# Patient Record
Sex: FEMALE | Race: WHITE | Employment: PART TIME | URBAN - NONMETROPOLITAN AREA
[De-identification: names, ages, dates, MRNs, and addresses within clinical notes are randomized per-mention and may not be internally consistent; named-entity substitution may affect disease eponyms.]

---

## 2021-08-16 ENCOUNTER — HOSPITAL ENCOUNTER (OUTPATIENT)
Dept: LAB | Age: 41
Discharge: HOME | End: 2021-08-16

## 2021-08-16 PROCEDURE — 99001 SPECIMEN HANDLING PT-LAB: CPT

## 2021-08-17 VITALS — BODY MASS INDEX: 17.08 KG/M2 | WEIGHT: 87 LBS | HEIGHT: 60 IN

## 2021-08-17 RX ORDER — SPIRONOLACTONE 25 MG/1
25 TABLET ORAL DAILY
COMMUNITY
End: 2022-02-28 | Stop reason: SDUPTHER

## 2021-08-17 RX ORDER — ATENOLOL 50 MG/1
50 TABLET ORAL DAILY
COMMUNITY
End: 2022-02-28 | Stop reason: SDUPTHER

## 2021-08-23 RX ORDER — INSULIN LISPRO 100 [IU]/ML
INJECTION, SOLUTION INTRAVENOUS; SUBCUTANEOUS ONCE
Status: CANCELLED | OUTPATIENT
Start: 2021-08-23 | End: 2021-08-24

## 2021-08-23 RX ORDER — SODIUM CHLORIDE, SODIUM LACTATE, POTASSIUM CHLORIDE, CALCIUM CHLORIDE 600; 310; 30; 20 MG/100ML; MG/100ML; MG/100ML; MG/100ML
25 INJECTION, SOLUTION INTRAVENOUS CONTINUOUS
Status: CANCELLED | OUTPATIENT
Start: 2021-08-23 | End: 2021-08-24

## 2021-08-23 RX ORDER — SODIUM CHLORIDE 0.9 % (FLUSH) 0.9 %
5-40 SYRINGE (ML) INJECTION AS NEEDED
Status: CANCELLED | OUTPATIENT
Start: 2021-08-23

## 2021-08-23 RX ORDER — SODIUM CHLORIDE 0.9 % (FLUSH) 0.9 %
5-40 SYRINGE (ML) INJECTION EVERY 8 HOURS
Status: CANCELLED | OUTPATIENT
Start: 2021-08-23

## 2021-08-24 ENCOUNTER — HOSPITAL ENCOUNTER (OUTPATIENT)
Dept: PREADMISSION TESTING | Age: 41
Discharge: HOME | End: 2021-08-24
Payer: MEDICAID

## 2021-08-24 LAB — SARS-COV-2, COV2: NORMAL

## 2021-08-24 PROCEDURE — U0003 INFECTIOUS AGENT DETECTION BY NUCLEIC ACID (DNA OR RNA); SEVERE ACUTE RESPIRATORY SYNDROME CORONAVIRUS 2 (SARS-COV-2) (CORONAVIRUS DISEASE [COVID-19]), AMPLIFIED PROBE TECHNIQUE, MAKING USE OF HIGH THROUGHPUT TECHNOLOGIES AS DESCRIBED BY CMS-2020-01-R: HCPCS

## 2021-08-25 LAB — SARS-COV-2, COV2NT: NOT DETECTED

## 2021-08-27 ENCOUNTER — HOSPITAL ENCOUNTER (OUTPATIENT)
Age: 41
Setting detail: OUTPATIENT SURGERY
Discharge: HOME | End: 2021-08-27
Attending: OBSTETRICS & GYNECOLOGY | Admitting: OBSTETRICS & GYNECOLOGY

## 2021-08-27 NOTE — PERIOP NOTES
Pt. States, \"I have been potentially exposed to my daughter who has covid. \" Anesthesia and surgeon aware and case cancelled.

## 2022-02-28 ENCOUNTER — HOSPITAL ENCOUNTER (EMERGENCY)
Age: 42
Discharge: HOME | End: 2022-02-28
Attending: EMERGENCY MEDICINE
Payer: MEDICAID

## 2022-02-28 VITALS
BODY MASS INDEX: 18.14 KG/M2 | OXYGEN SATURATION: 100 % | WEIGHT: 90 LBS | HEIGHT: 59 IN | TEMPERATURE: 97.8 F | RESPIRATION RATE: 18 BRPM | SYSTOLIC BLOOD PRESSURE: 162 MMHG | HEART RATE: 100 BPM | DIASTOLIC BLOOD PRESSURE: 111 MMHG

## 2022-02-28 DIAGNOSIS — J02.9 SORETHROAT: Primary | ICD-10-CM

## 2022-02-28 LAB
DEPRECATED S PYO AG THROAT QL EIA: NEGATIVE
FLUAV AG NPH QL IA: NEGATIVE
FLUBV AG NOSE QL IA: NEGATIVE

## 2022-02-28 PROCEDURE — 74011000250 HC RX REV CODE- 250: Performed by: EMERGENCY MEDICINE

## 2022-02-28 PROCEDURE — 87880 STREP A ASSAY W/OPTIC: CPT

## 2022-02-28 PROCEDURE — 87804 INFLUENZA ASSAY W/OPTIC: CPT

## 2022-02-28 PROCEDURE — 96372 THER/PROPH/DIAG INJ SC/IM: CPT

## 2022-02-28 PROCEDURE — 99284 EMERGENCY DEPT VISIT MOD MDM: CPT

## 2022-02-28 PROCEDURE — 74011250636 HC RX REV CODE- 250/636: Performed by: EMERGENCY MEDICINE

## 2022-02-28 PROCEDURE — 87147 CULTURE TYPE IMMUNOLOGIC: CPT

## 2022-02-28 PROCEDURE — 87070 CULTURE OTHR SPECIMN AEROBIC: CPT

## 2022-02-28 RX ORDER — KETOROLAC TROMETHAMINE 10 MG/1
10 TABLET, FILM COATED ORAL
Qty: 12 TABLET | Refills: 0 | Status: SHIPPED | OUTPATIENT
Start: 2022-02-28 | End: 2022-03-28

## 2022-02-28 RX ORDER — KETOROLAC TROMETHAMINE 30 MG/ML
60 INJECTION, SOLUTION INTRAMUSCULAR; INTRAVENOUS
Status: COMPLETED | OUTPATIENT
Start: 2022-02-28 | End: 2022-02-28

## 2022-02-28 RX ORDER — ATENOLOL 50 MG/1
50 TABLET ORAL DAILY
Qty: 20 TABLET | Refills: 0 | Status: SHIPPED | OUTPATIENT
Start: 2022-02-28 | End: 2022-03-28 | Stop reason: SDUPTHER

## 2022-02-28 RX ORDER — SPIRONOLACTONE 25 MG/1
25 TABLET ORAL DAILY
Qty: 20 TABLET | Refills: 0 | Status: SHIPPED | OUTPATIENT
Start: 2022-02-28 | End: 2022-03-28 | Stop reason: SDUPTHER

## 2022-02-28 RX ORDER — LIDOCAINE HYDROCHLORIDE 20 MG/ML
10 SOLUTION OROPHARYNGEAL
Status: COMPLETED | OUTPATIENT
Start: 2022-02-28 | End: 2022-02-28

## 2022-02-28 RX ORDER — OMEPRAZOLE 20 MG/1
20 CAPSULE, DELAYED RELEASE ORAL DAILY
Qty: 20 CAPSULE | Refills: 0 | Status: SHIPPED | OUTPATIENT
Start: 2022-02-28 | End: 2022-03-28 | Stop reason: SDUPTHER

## 2022-02-28 RX ADMIN — KETOROLAC TROMETHAMINE 60 MG: 30 INJECTION, SOLUTION INTRAMUSCULAR at 11:20

## 2022-02-28 RX ADMIN — Medication 10 ML: at 11:21

## 2022-02-28 NOTE — ED TRIAGE NOTES
Sore throat x 3 days. Hurts to swallow. Also c/o cough, congestion and chills. Has taken Tylenol and aleve with no relief. Last dose 3 hours ago for the tylenol, yesterday was last dose of the aleve.

## 2022-03-02 LAB
BACTERIA SPEC CULT: NORMAL
BACTERIA SPEC CULT: NORMAL
SPECIAL REQUESTS,SREQ: NORMAL

## 2022-03-10 NOTE — ED PROVIDER NOTES
EMERGENCY DEPARTMENT HISTORY AND PHYSICAL EXAM      Date: 2/28/2022  Patient Name: Xavier Gaston    History of Presenting Illness     Chief Complaint   Patient presents with    Sore Throat       History Provided By: Patient    HPI: Xavier Gaston, 38 y.o. female with a past medical history significant hypertension presents to the ED with cc of sore throat. Patient reports sore throat for 3 days. It hurts to swallow. Sore throat associated with a dry cough, congestion and some chills. She has taken Tylenol and Aleve with no relief. There are no other complaints, changes, or physical findings at this time. PCP: None    No current facility-administered medications on file prior to encounter. No current outpatient medications on file prior to encounter. Past History     Past Medical History:  Past Medical History:   Diagnosis Date    Hypertension        Past Surgical History:  History reviewed. No pertinent surgical history. Family History:  History reviewed. No pertinent family history. Social History:  Social History     Tobacco Use    Smoking status: Current Every Day Smoker     Packs/day: 0.50    Smokeless tobacco: Never Used   Substance Use Topics    Alcohol use: Not Currently    Drug use: Not Currently       Allergies: Allergies   Allergen Reactions    Pcn [Penicillins] Hives    Sulfa (Sulfonamide Antibiotics) Hives         Review of Systems     Review of Systems   All other systems reviewed and are negative. Physical Exam     Physical Exam  Vitals and nursing note reviewed. Constitutional:       General: She is not in acute distress. Appearance: She is well-developed. She is not diaphoretic. HENT:      Head: Normocephalic and atraumatic. Jaw: No trismus. Right Ear: External ear normal. No swelling or tenderness. Tympanic membrane is not perforated, erythematous or bulging. Left Ear: External ear normal. No swelling or tenderness.  Tympanic membrane is not perforated, erythematous or bulging. Nose: Nose normal. No mucosal edema or rhinorrhea. Right Sinus: No maxillary sinus tenderness or frontal sinus tenderness. Left Sinus: No maxillary sinus tenderness or frontal sinus tenderness. Mouth/Throat:      Mouth: No oral lesions. Dentition: No dental abscesses. Pharynx: Uvula midline. No oropharyngeal exudate, posterior oropharyngeal erythema or uvula swelling. Tonsils: No tonsillar abscesses. Comments: Pharynx is erythematous, minimal swelling, uvula midline and voice is clear. No exudates noted. Eyes:      General: No scleral icterus. Right eye: No discharge. Left eye: No discharge. Conjunctiva/sclera: Conjunctivae normal.   Cardiovascular:      Rate and Rhythm: Normal rate and regular rhythm. Heart sounds: Normal heart sounds. No murmur heard. No friction rub. No gallop. Pulmonary:      Effort: Pulmonary effort is normal. No tachypnea, accessory muscle usage or respiratory distress. Breath sounds: Normal breath sounds. No decreased breath sounds, wheezing, rhonchi or rales. Abdominal:      Palpations: Abdomen is soft. Musculoskeletal:         General: No tenderness. Normal range of motion. Cervical back: Normal range of motion and neck supple. Lymphadenopathy:      Cervical: No cervical adenopathy. Skin:     General: Skin is warm and dry. Findings: No erythema or rash. Neurological:      Mental Status: She is alert and oriented to person, place, and time. Psychiatric:         Judgment: Judgment normal.         Lab and Diagnostic Study Results     Labs -   No results found for this or any previous visit (from the past 12 hour(s)). Radiologic Studies -   @lastxrresult@  CT Results  (Last 48 hours)    None        CXR Results  (Last 48 hours)    None            Medical Decision Making   - I am the first provider for this patient.     - I reviewed the vital signs, available nursing notes, past medical history, past surgical history, family history and social history. - Initial assessment performed. The patients presenting problems have been discussed, and they are in agreement with the care plan formulated and outlined with them. I have encouraged them to ask questions as they arise throughout their visit. Vital Signs-Reviewed the patient's vital signs. No data found. Records Reviewed: Nursing Notes and Old Medical Records    The patient presents with sore throat. ED Course:          Provider Notes (Medical Decision Making):         Procedures   Medical Decision Makingedical Decision Making      Disposition   Disposition: Condition stable and improved  DC- Adult Discharges: All of the diagnostic tests were reviewed and questions answered. Diagnosis, care plan and treatment options were discussed. The patient understands the instructions and will follow up as directed. The patients results have been reviewed with them. They have been counseled regarding their diagnosis. The patient verbally convey understanding and agreement of the signs, symptoms, diagnosis, treatment and prognosis and additionally agrees to follow up as recommended with their PCP in 24 - 48 hours. They also agree with the care-plan and convey that all of their questions have been answered. I have also put together some discharge instructions for them that include: 1) educational information regarding their diagnosis, 2) how to care for their diagnosis at home, as well a 3) list of reasons why they would want to return to the ED prior to their follow-up appointment, should their condition change. Diagnosis:   1.  Sorethroat          Disposition:     Follow-up Information     Follow up With Specialties Details Why Contact Jennifer Hood    [address] [facility] [address]    [place], [address]   [address] [address]  [phone]            Discharge Medication List as of 2/28/2022 12:13 PM      START taking these medications    Details   omeprazole (PRILOSEC) 20 mg capsule Take 1 Capsule by mouth daily. , Normal, Disp-20 Capsule, R-0      ketorolac (TORADOL) 10 mg tablet Take 1 Tablet by mouth every eight (8) hours as needed for Pain., Normal, Disp-12 Tablet, R-0         CONTINUE these medications which have CHANGED    Details   atenoloL (TENORMIN) 50 mg tablet Take 1 Tablet by mouth daily. Indications: high blood pressure, Normal, Disp-20 Tablet, R-0      spironolactone (ALDACTONE) 25 mg tablet Take 1 Tablet by mouth daily. Indications: high blood pressure, Normal, Disp-20 Tablet, R-0             DISCHARGE PLAN:  1. Cannot display discharge medications since this patient is not currently admitted. 2.   Follow-up Information     Follow up With Specialties Details Why [facility]    [address], [address] [facility]  [phone]          3. Return to ED if worse   4. Discharge Medication List as of 2/28/2022 12:13 PM      START taking these medications    Details   omeprazole (PRILOSEC) 20 mg capsule Take 1 Capsule by mouth daily. , Normal, Disp-20 Capsule, R-0      ketorolac (TORADOL) 10 mg tablet Take 1 Tablet by mouth every eight (8) hours as needed for Pain., Normal, Disp-12 Tablet, R-0         CONTINUE these medications which have CHANGED    Details   atenoloL (TENORMIN) 50 mg tablet Take 1 Tablet by mouth daily. Indications: high blood pressure, Normal, Disp-20 Tablet, R-0      spironolactone (ALDACTONE) 25 mg tablet Take 1 Tablet by mouth daily. Indications: high blood pressure, Normal, Disp-20 Tablet, R-0               Diagnosis     Clinical Impression:   1.  Sorethroat        Attestations:    Redd Rodriguez MD    Please note that this dictation was completed with Dragon, the computer voice recognition software. Quite often unanticipated grammatical, syntax, homophones, and other interpretive errors are inadvertently transcribed by the computer software. Please disregard these errors. Please excuse any errors that have escaped final proofreading. Thank you.

## 2022-03-28 ENCOUNTER — OFFICE VISIT (OUTPATIENT)
Dept: FAMILY MEDICINE CLINIC | Age: 42
End: 2022-03-28
Payer: MEDICAID

## 2022-03-28 ENCOUNTER — HOSPITAL ENCOUNTER (OUTPATIENT)
Dept: LAB | Age: 42
Discharge: HOME | End: 2022-03-28

## 2022-03-28 VITALS
SYSTOLIC BLOOD PRESSURE: 156 MMHG | OXYGEN SATURATION: 100 % | DIASTOLIC BLOOD PRESSURE: 95 MMHG | TEMPERATURE: 98.2 F | HEART RATE: 96 BPM | RESPIRATION RATE: 20 BRPM | BODY MASS INDEX: 19.29 KG/M2 | HEIGHT: 59 IN | WEIGHT: 95.7 LBS

## 2022-03-28 DIAGNOSIS — F17.210 CIGARETTE NICOTINE DEPENDENCE WITHOUT COMPLICATION: ICD-10-CM

## 2022-03-28 DIAGNOSIS — I10 PRIMARY HYPERTENSION: Primary | ICD-10-CM

## 2022-03-28 DIAGNOSIS — Z12.31 SCREENING MAMMOGRAM, ENCOUNTER FOR: ICD-10-CM

## 2022-03-28 DIAGNOSIS — K21.9 GASTROESOPHAGEAL REFLUX DISEASE WITHOUT ESOPHAGITIS: ICD-10-CM

## 2022-03-28 DIAGNOSIS — R10.9 LEFT FLANK PAIN: ICD-10-CM

## 2022-03-28 DIAGNOSIS — Z12.31 ENCOUNTER FOR SCREENING MAMMOGRAM FOR MALIGNANT NEOPLASM OF BREAST: ICD-10-CM

## 2022-03-28 DIAGNOSIS — Z00.00 PREVENTATIVE HEALTH CARE: ICD-10-CM

## 2022-03-28 DIAGNOSIS — F90.8 ATTENTION DEFICIT HYPERACTIVITY DISORDER (ADHD), OTHER TYPE: ICD-10-CM

## 2022-03-28 DIAGNOSIS — E55.9 VITAMIN D DEFICIENCY: ICD-10-CM

## 2022-03-28 DIAGNOSIS — R94.31 ABNORMAL EKG: ICD-10-CM

## 2022-03-28 PROBLEM — F41.1 GENERALIZED ANXIETY DISORDER: Status: ACTIVE | Noted: 2017-08-26

## 2022-03-28 PROCEDURE — 99001 SPECIMEN HANDLING PT-LAB: CPT

## 2022-03-28 PROCEDURE — 93000 ELECTROCARDIOGRAM COMPLETE: CPT | Performed by: NURSE PRACTITIONER

## 2022-03-28 PROCEDURE — 99214 OFFICE O/P EST MOD 30 MIN: CPT | Performed by: NURSE PRACTITIONER

## 2022-03-28 RX ORDER — OMEPRAZOLE 20 MG/1
20 CAPSULE, DELAYED RELEASE ORAL DAILY
Qty: 90 CAPSULE | Refills: 3 | Status: SHIPPED | OUTPATIENT
Start: 2022-03-28

## 2022-03-28 RX ORDER — SPIRONOLACTONE 25 MG/1
25 TABLET ORAL DAILY
Qty: 90 TABLET | Refills: 2 | Status: SHIPPED | OUTPATIENT
Start: 2022-03-28

## 2022-03-28 RX ORDER — ATENOLOL 50 MG/1
50 TABLET ORAL DAILY
Qty: 90 TABLET | Refills: 3 | Status: SHIPPED | OUTPATIENT
Start: 2022-03-28

## 2022-03-28 NOTE — PROGRESS NOTES
History of Present Illness  Raulito Luna is a 38 y.o. female who presents today to [facility] with new provider. She has a past medical history of hypertension, GERD, anxiety, ADHD, reports she had an abnormal Pap smear GYN wants to perform biopsies, she has not had that done she will follow-up with them. Reports having chest palpitations a couple times a month also reports on and off left flank pain. She reports she has a past history of kidney disease reviewed labs from  kidney function was normal.  Reports her thoughts are always racing past medical history with ADHD, reports she was on multiple antidepressants and multiple antianxiety medications without relief. Chief Complaint   Patient presents with    Establish Care    Medication Refill           Past Medical History:   Diagnosis Date    Anxiety     GERD (gastroesophageal reflux disease)     Hypertension     Hypokalemia     Kidney disease     Rhabdomyolysis         Past Surgical History:   Procedure Laterality Date    HX  SECTION      x2, cervical biopsy, HPV        Current Medications  Current Outpatient Medications   Medication Sig    omeprazole (PRILOSEC) 20 mg capsule Take 1 Capsule by mouth daily.  atenoloL (TENORMIN) 50 mg tablet Take 1 Tablet by mouth daily. Indications: high blood pressure    spironolactone (ALDACTONE) 25 mg tablet Take 1 Tablet by mouth daily. Indications: high blood pressure     No current facility-administered medications for this visit.          Allergies   Allergen Reactions    Pcn [Penicillins] Hives    Sulfa (Sulfonamide Antibiotics) Hives          Family History   Problem Relation Age of Onset    Hypertension Mother    [place] Anxiety Mother     Stroke Maternal Grandmother     Cancer Maternal Grandfather     Heart Disease Paternal Grandmother           Social History     Tobacco Use    Smoking status: Current Every Day Smoker     Packs/day: 0.50    Smokeless tobacco: Never Used Vaping Use    Vaping Use: Not on file   Substance Use Topics    Alcohol use: Not Currently    Drug use: Not Currently        Health Maintenance   Topic Date Due    Hepatitis C Screening  Never done    Depression Screen  Never done    COVID-19 Vaccine (1) Never done    Pneumococcal 0-64 years (1 of 2 - PPSV23) Never done    DTaP/Tdap/Td series (1 - Tdap) Never done    Cervical cancer screen  Never done    Flu Vaccine (1) Never done    Breast Cancer Screen Mammogram  Never done    Lipid Screen  Never done       There is no immunization history on file for this patient. Review of Systems  Review of Systems   Psychiatric/Behavioral: The patient is nervous/anxious. All other systems reviewed and are negative. Physical Exam  Vitals reviewed. Constitutional:       Appearance: Normal appearance. HENT:      Head: Normocephalic. Nose: Nose normal.      Mouth/Throat:      Mouth: Mucous membranes are moist.   Eyes:      Extraocular Movements: Extraocular movements intact. Cardiovascular:      Rate and Rhythm: Normal rate and regular rhythm. Pulmonary:      Effort: Pulmonary effort is normal.      Breath sounds: Normal breath sounds. Abdominal:      General: Bowel sounds are normal.      Palpations: Abdomen is soft. Musculoskeletal:         General: Normal range of motion. Cervical back: Normal range of motion. Skin:     General: Skin is warm. Neurological:      General: No focal deficit present. Mental Status: She is alert. Psychiatric:         Mood and Affect: Mood is anxious.             Visit Vitals  BP (!) 156/95 Comment: HAS been out of BP medication x 2 days   Pulse 96   Temp 98.2 °F (36.8 °C)   Resp 20   Ht 4' 11\" (1.499 m)   Wt 95 lb 11.2 oz (43.4 kg)   SpO2 100%   BMI 19.33 kg/m²          Laboratory/Tests:  Admission on 02/28/2022, Discharged on 02/28/2022   Component Date Value Ref Range Status    Group A Strep Ag ID 02/28/2022 Negative    Final    Special Requests: 02/28/2022 No Special Requests    Final    Culture result: 02/28/2022 Moderate Beta strep,notgroup A,B,C,F or G    Final    Culture result: 02/28/2022 Heavy Normal respiratory jazmin    Final    Influenza A Antigen 02/28/2022 Negative  Negative   Final    Influenza B Antigen 02/28/2022 Negative  Negative   Final         Assessment/Plan:     1. Primary hypertension  Has been out of her medication for several days she will restart medication today  - atenoloL (TENORMIN) 50 mg tablet; Take 1 Tablet by mouth daily. Indications: high blood pressure  Dispense: 90 Tablet; Refill: 3  - spironolactone (ALDACTONE) 25 mg tablet; Take 1 Tablet by mouth daily. Indications: high blood pressure  Dispense: 90 Tablet; Refill: 2    2. Gastroesophageal reflux disease without esophagitis  Stable continue Prilosec  - omeprazole (PRILOSEC) 20 mg capsule; Take 1 Capsule by mouth daily. Dispense: 90 Capsule; Refill: 3    3. Preventative health care  - CBC WITH AUTOMATED DIFF; Future  - METABOLIC PANEL, COMPREHENSIVE; Future  - LIPID PANEL; Future  - TSH 3RD GENERATION; Future  - HEMOGLOBIN A1C WITH EAG; Future  - HEPATITIS C AB; Future  - AMB POC EKG ROUTINE W/ 12 LEADS, INTER & REP    4. Screening mammogram, encounter for  - [facility] MAMMO BI SCREENING INCL CAD; Future    5. Vitamin D deficiency  - VITAMIN D, 25 HYDROXY; Future    6. Left flank pain  - URINALYSIS W/ REFLEX CULTURE; Future    7. Attention deficit hyperactivity disorder (ADHD), other type    - REFERRAL TO PSYCHIATRY  8. Cigarette nicotine dependence without complication  Smoking cessation discussed. 9. Abnormal EKG  POC EKG showed sinus rhythm, poor Rwave progressionnonspecificconsider old anterior infarct  - REFERRAL TO CARDIOLOGY        I have discussed the diagnosis with the patient and the intended plan as seen in the above orders. The patient has received an after-visit summary and questions were answered concerning future plans.   I have discussed medication side effects and warnings with the patient as well. I have reviewed the plan of care with the patient, accepted their input and they are in agreement with the treatment goals. Previous lab and imaging results were reviewed by me.        [address] [place] [address], NP-C  March 28, 2022

## 2022-03-28 NOTE — PROGRESS NOTES
Patient would like a referral to a new OBGYN. [employer] presents today for   Chief Complaint   Patient presents with    Establish Care    Medication Refill       Is someone accompanying this pt? no  Is the patient using any DME equipment during OV? no    Depression Screening:  3 most recent PHQ Screens 3/28/2022   Little interest or pleasure in doing things Not at all   Feeling down, depressed, irritable, or hopeless Several days   Total Score PHQ 2 1       Learning Assessment:  No flowsheet data found. Health Maintenance reviewed and discussed and ordered per Provider. Health Maintenance Due   Topic Date Due    Hepatitis C Screening  Never done    Depression Screen  Never done    COVID-19 Vaccine (1) Never done    Pneumococcal 0-64 years (1 of 2 - PPSV23) Never done    DTaP/Tdap/Td series (1 - Tdap) Never done    Cervical cancer screen  Never done    Flu Vaccine (1) Never done    Lipid Screen  Never done   . Coordination of Care:  1. \"Have you been to the ER, urgent care clinic since your last visit? Hospitalized since your last visit? Yes [facility] 2/2022    2. \"Have you seen or consulted any other health care providers outside of the [address] [place] [address] since your last visit? \" No     3. For patients aged 39-70: Has the patient had a colonoscopy? No     If the patient is female:    4. For patients aged 41-77: Has the patient had a mammogram within the past 2 years? NA - based on age    11. For patients aged 21-65: Has the patient had a pap smear? Yes - no Care Gap present last year Dr Susie Hightower, supposed to have a hysterectomy.

## 2022-03-29 LAB
25(OH)D3+25(OH)D2 SERPL-MCNC: 11.7 NG/ML (ref 30–100)
ALBUMIN SERPL-MCNC: 4.6 G/DL (ref 3.8–4.8)
ALBUMIN/GLOB SERPL: 1.6 {RATIO} (ref 1.2–2.2)
ALP SERPL-CCNC: 73 IU/L (ref 44–121)
ALT SERPL-CCNC: 8 IU/L (ref 0–32)
APPEARANCE UR: CLEAR
AST SERPL-CCNC: 16 IU/L (ref 0–40)
BACTERIA #/AREA URNS HPF: NORMAL /[HPF]
BASOPHILS # BLD AUTO: 0.1 X10E3/UL (ref 0–0.2)
BASOPHILS NFR BLD AUTO: 1 %
BILIRUB SERPL-MCNC: <0.2 MG/DL (ref 0–1.2)
BILIRUB UR QL STRIP: NEGATIVE
BUN SERPL-MCNC: 10 MG/DL (ref 6–24)
BUN/CREAT SERPL: 15 (ref 9–23)
CALCIUM SERPL-MCNC: 9.1 MG/DL (ref 8.7–10.2)
CASTS URNS QL MICRO: NORMAL /LPF
CHLORIDE SERPL-SCNC: 105 MMOL/L (ref 96–106)
CHOLEST SERPL-MCNC: 196 MG/DL (ref 100–199)
CO2 SERPL-SCNC: 19 MMOL/L (ref 20–29)
COLOR UR: YELLOW
CREAT SERPL-MCNC: 0.66 MG/DL (ref 0.57–1)
EGFR: 114 ML/MIN/1.73
EOSINOPHIL # BLD AUTO: 0.4 X10E3/UL (ref 0–0.4)
EOSINOPHIL NFR BLD AUTO: 4 %
EPI CELLS #/AREA URNS HPF: NORMAL /HPF (ref 0–10)
ERYTHROCYTE [DISTWIDTH] IN BLOOD BY AUTOMATED COUNT: 13.4 % (ref 11.7–15.4)
EST. AVERAGE GLUCOSE BLD GHB EST-MCNC: 114 MG/DL
GLOBULIN SER CALC-MCNC: 2.8 G/DL (ref 1.5–4.5)
GLUCOSE SERPL-MCNC: 85 MG/DL (ref 65–99)
GLUCOSE UR QL STRIP: NEGATIVE
HBA1C MFR BLD: 5.6 % (ref 4.8–5.6)
HCT VFR BLD AUTO: 32 % (ref 34–46.6)
HCV AB S/CO SERPL IA: <0.1 S/CO RATIO (ref 0–0.9)
HDLC SERPL-MCNC: 70 MG/DL
HGB BLD-MCNC: 10.7 G/DL (ref 11.1–15.9)
HGB UR QL STRIP: NEGATIVE
IMM GRANULOCYTES # BLD AUTO: 0 X10E3/UL (ref 0–0.1)
IMM GRANULOCYTES NFR BLD AUTO: 0 %
KETONES UR QL STRIP: NEGATIVE
LDLC SERPL CALC-MCNC: 87 MG/DL (ref 0–99)
LEUKOCYTE ESTERASE UR QL STRIP: NEGATIVE
LYMPHOCYTES # BLD AUTO: 2.7 X10E3/UL (ref 0.7–3.1)
LYMPHOCYTES NFR BLD AUTO: 26 %
MCH RBC QN AUTO: 32.9 PG (ref 26.6–33)
MCHC RBC AUTO-ENTMCNC: 33.4 G/DL (ref 31.5–35.7)
MCV RBC AUTO: 99 FL (ref 79–97)
MICRO URNS: NORMAL
MICRO URNS: NORMAL
MONOCYTES # BLD AUTO: 0.8 X10E3/UL (ref 0.1–0.9)
MONOCYTES NFR BLD AUTO: 8 %
NEUTROPHILS # BLD AUTO: 6.2 X10E3/UL (ref 1.4–7)
NEUTROPHILS NFR BLD AUTO: 61 %
NITRITE UR QL STRIP: NEGATIVE
PH UR STRIP: 6.5 [PH] (ref 5–7.5)
PLATELET # BLD AUTO: 452 X10E3/UL (ref 150–450)
POTASSIUM SERPL-SCNC: 4.3 MMOL/L (ref 3.5–5.2)
PROT SERPL-MCNC: 7.4 G/DL (ref 6–8.5)
PROT UR QL STRIP: NEGATIVE
RBC # BLD AUTO: 3.25 X10E6/UL (ref 3.77–5.28)
RBC #/AREA URNS HPF: NORMAL /HPF (ref 0–2)
SODIUM SERPL-SCNC: 138 MMOL/L (ref 134–144)
SP GR UR STRIP: 1.01 (ref 1–1.03)
SPECIMEN STATUS REPORT, ROLRST: NORMAL
TRIGL SERPL-MCNC: 235 MG/DL (ref 0–149)
TSH SERPL DL<=0.005 MIU/L-ACNC: 1.05 UIU/ML (ref 0.45–4.5)
URINALYSIS REFLEX, 377202: NORMAL
UROBILINOGEN UR STRIP-MCNC: 0.2 MG/DL (ref 0.2–1)
VLDLC SERPL CALC-MCNC: 39 MG/DL (ref 5–40)
WBC # BLD AUTO: 10.2 X10E3/UL (ref 3.4–10.8)
WBC #/AREA URNS HPF: NORMAL /HPF (ref 0–5)

## 2022-04-04 RX ORDER — ERGOCALCIFEROL 1.25 MG/1
50000 CAPSULE ORAL
Qty: 8 CAPSULE | Refills: 0 | Status: SHIPPED | OUTPATIENT
Start: 2022-04-04 | End: 2022-06-14 | Stop reason: ALTCHOICE

## 2022-04-05 NOTE — PROGRESS NOTES
Labs reviewed CBC within normal limits CMP normal urinalysis lipid panel showed an increase in triglycerides will instruct patient to decrease transsaturated fats in the diet, A1c was normal vitamin D was low we will start on vitamin D supplements TSH was normal hepatitis C was negative

## 2022-05-23 ENCOUNTER — TELEPHONE (OUTPATIENT)
Dept: FAMILY MEDICINE CLINIC | Age: 42
End: 2022-05-23

## 2022-05-23 NOTE — TELEPHONE ENCOUNTER
----- Message from Kylie Hahnx sent at 2022 10:35 AM EDT -----  Subject: Appointment Request    Reason for Call: Urgent Joint Pain    QUESTIONS  Type of Appointment? Established Patient  Reason for appointment request? No appointments available during search  Additional Information for Provider? Patient called stated she has both   hands and feet swelling for one week, no appointments available during   search showing only 2022  ---------------------------------------------------------------------------  --------------  CALL BACK INFO  What is the best way for the office to contact you? OK to leave message on   voicemail  Preferred Call Back Phone Number? [phone]  ---------------------------------------------------------------------------  --------------  SCRIPT ANSWERS  Relationship to Patient? Self  Specialty Confirmation? Primary Care  Is this the first appointment to establish care for a ? No  Have you been diagnosed with, awaiting test results for, or told that you   are suspected of having COVID-19 (Coronavirus)? (If patient has tested   negative or was tested as a requirement for work, school, or travel and   not based on symptoms, answer no)? No  Within the past 10 days have you developed any of the following symptoms   (answer no if symptoms have been present longer than 10 days or began   more than 10 days ago)? Fever or Chills, Cough, Shortness of breath or   difficulty breathing, Loss of taste or smell, Sore throat, Nasal   congestion, Sneezing or runny nose, Fatigue or generalized body aches   (answer no if pain is specific to a body part e.g. back pain), Diarrhea,   Headache? No  Have you had close contact with someone with COVID-19 in the last 7 days? No  (Service Expert  click yes below to proceed with [employer] As Usual   Scheduling)? Yes  Have you been diagnosed with, awaiting test results for, or told that you   are suspected of having COVID-19 (Coronavirus)?  (If patient has tested   negative or was tested as a requirement for work, school, or travel and   not based on symptoms, answer no)? No  Within the past 10 days have you developed any of the following symptoms   (answer no if symptoms have been present longer than 10 days or began   more than 10 days ago)? Fever or Chills, Cough, Shortness of breath or   difficulty breathing, Loss of taste or smell, Sore throat, Nasal   congestion, Sneezing or runny nose, Fatigue or generalized body aches   (answer no if pain is specific to a body part e.g. back pain), Diarrhea,   Headache? No  Have you had close contact with someone with COVID-19 in the last 7 days? No  (Service Expert  click yes below to proceed with [employer] As Usual   Scheduling)? Yes  Is this follow up request related to routine Diabetes Management? No  Did you have an injury or trauma within the past 3 days? No  Is your joint red or swollen?  Yes

## 2022-06-08 ENCOUNTER — OFFICE VISIT (OUTPATIENT)
Dept: FAMILY MEDICINE CLINIC | Age: 42
End: 2022-06-08
Payer: MEDICAID

## 2022-06-08 ENCOUNTER — TELEPHONE (OUTPATIENT)
Dept: FAMILY MEDICINE CLINIC | Age: 42
End: 2022-06-08

## 2022-06-08 ENCOUNTER — HOSPITAL ENCOUNTER (OUTPATIENT)
Dept: LAB | Age: 42
Discharge: HOME | End: 2022-06-08

## 2022-06-08 VITALS
BODY MASS INDEX: 20.16 KG/M2 | OXYGEN SATURATION: 99 % | HEIGHT: 59 IN | DIASTOLIC BLOOD PRESSURE: 73 MMHG | SYSTOLIC BLOOD PRESSURE: 103 MMHG | WEIGHT: 100 LBS | TEMPERATURE: 99 F | HEART RATE: 89 BPM

## 2022-06-08 DIAGNOSIS — E55.9 VITAMIN D DEFICIENCY: ICD-10-CM

## 2022-06-08 DIAGNOSIS — D64.9 ANEMIA, UNSPECIFIED TYPE: Primary | ICD-10-CM

## 2022-06-08 DIAGNOSIS — D64.9 ANEMIA, UNSPECIFIED TYPE: ICD-10-CM

## 2022-06-08 DIAGNOSIS — F90.8 ATTENTION DEFICIT HYPERACTIVITY DISORDER (ADHD), OTHER TYPE: ICD-10-CM

## 2022-06-08 PROCEDURE — 99001 SPECIMEN HANDLING PT-LAB: CPT

## 2022-06-08 PROCEDURE — 99214 OFFICE O/P EST MOD 30 MIN: CPT | Performed by: NURSE PRACTITIONER

## 2022-06-08 PROCEDURE — 36415 COLL VENOUS BLD VENIPUNCTURE: CPT

## 2022-06-08 NOTE — PROGRESS NOTES
Agus Charles (: 1980) is a 38 y.o. female, established patient, here for evaluation of the following chief complaint(s):  Tingling and Numbness       ASSESSMENT/PLAN:  Below is the assessment and plan developed based on review of pertinent history, physical exam, labs, studies, and medications. 1. Anemia, unspecified type  -     IRON PROFILE; Future  -     VITAMIN B12 & FOLATE; Future  -     CBC WITH AUTOMATED DIFF; Future  -     METABOLIC PANEL, COMPREHENSIVE; Future  -     FERRITIN; Future  2. Vitamin D deficiency  -     VITAMIN D, 25 HYDROXY; Future      No follow-ups on file. SUBJECTIVE/OBJECTIVE:  Patient reports today with complaints of numbness and tingling to her hands, reports sometimes they feel like they are cramped up and she is concerned about her electrolytes being off. This is been going on for the last couple of weeks. Denies any other symptoms. Does have a past medical history of anemia we will get updated lab work. Review of Systems   All other systems reviewed and are negative. Physical Exam  Cardiovascular:      Rate and Rhythm: Normal rate and regular rhythm. Pulses: Normal pulses. Heart sounds: Normal heart sounds. Pulmonary:      Effort: Pulmonary effort is normal.      Breath sounds: Normal breath sounds. Abdominal:      General: Bowel sounds are normal.   Neurological:      Mental Status: She is alert and oriented to person, place, and time. On this date 2022 I have spent 30 minutes reviewing previous notes, test results and face to face with the patient discussing the diagnosis and importance of compliance with the treatment plan as well as documenting on the day of the visit. An electronic signature was used to authenticate this note.   -- [address] [place] [address], NP-C

## 2022-06-08 NOTE — TELEPHONE ENCOUNTER
Patient calls to say that she called [facility] to make an appointment and the only thing they do is counseling and she needs a referral for a place that can test her for adhd and prescribe medication.

## 2022-06-08 NOTE — PROGRESS NOTES
Mercedes Magaña presents today for   Chief Complaint   Patient presents with    Hand Swelling    Foot Swelling    Tingling    Numbness       Is someone accompanying this pt? No     Is the patient using any DME equipment during OV? No     Depression Screening:  3 most recent PHQ Screens 6/8/2022   Little interest or pleasure in doing things Not at all   Feeling down, depressed, irritable, or hopeless Not at all   Total Score PHQ 2 0       Learning Assessment:  No flowsheet data found. Fall Risk  No flowsheet data found. Health Maintenance reviewed and discussed and ordered per Provider. Health Maintenance Due   Topic Date Due    COVID-19 Vaccine (1) Never done    Pneumococcal 0-64 years (1 - PCV) Never done    DTaP/Tdap/Td series (1 - Tdap) Never done    Cervical cancer screen  Never done    Breast Cancer Screen Mammogram  Never done   . Coordination of Care:    1. \"Have you been to the ER, urgent care clinic since your last visit? Hospitalized since your last visit? \" No    2. \"Have you seen or consulted any other health care providers outside of the [address] [place] [address] since your last visit? \" No     3. For patients aged 39-70: Has the patient had a colonoscopy? NA - based on age     If the patient is female:    4. For patients aged 41-77: Has the patient had a mammogram within the past 2 years? No one was ordered       5. For patients aged 21-65: Has the patient had a pap smear? Yes - Care Gap present.  Rooming MA/LPN to request most recent results Dr. Roberto Kim 8/2021

## 2022-06-09 LAB
25(OH)D3+25(OH)D2 SERPL-MCNC: 35.1 NG/ML (ref 30–100)
ALBUMIN SERPL-MCNC: 4.2 G/DL (ref 3.8–4.8)
ALBUMIN/GLOB SERPL: 1.6 {RATIO} (ref 1.2–2.2)
ALP SERPL-CCNC: 52 IU/L (ref 44–121)
ALT SERPL-CCNC: 7 IU/L (ref 0–32)
AST SERPL-CCNC: 11 IU/L (ref 0–40)
BASOPHILS # BLD AUTO: 0.1 X10E3/UL (ref 0–0.2)
BASOPHILS NFR BLD AUTO: 1 %
BILIRUB SERPL-MCNC: <0.2 MG/DL (ref 0–1.2)
BUN SERPL-MCNC: 9 MG/DL (ref 6–24)
BUN/CREAT SERPL: 12 (ref 9–23)
CALCIUM SERPL-MCNC: 9.2 MG/DL (ref 8.7–10.2)
CHLORIDE SERPL-SCNC: 104 MMOL/L (ref 96–106)
CO2 SERPL-SCNC: 20 MMOL/L (ref 20–29)
CREAT SERPL-MCNC: 0.75 MG/DL (ref 0.57–1)
EGFR: 103 ML/MIN/1.73
EOSINOPHIL # BLD AUTO: 0.5 X10E3/UL (ref 0–0.4)
EOSINOPHIL NFR BLD AUTO: 5 %
ERYTHROCYTE [DISTWIDTH] IN BLOOD BY AUTOMATED COUNT: 14.2 % (ref 11.7–15.4)
FERRITIN SERPL-MCNC: 11 NG/ML (ref 15–150)
FOLATE SERPL-MCNC: 11.9 NG/ML
GLOBULIN SER CALC-MCNC: 2.6 G/DL (ref 1.5–4.5)
GLUCOSE SERPL-MCNC: 68 MG/DL (ref 65–99)
HCT VFR BLD AUTO: 34.6 % (ref 34–46.6)
HGB BLD-MCNC: 11.4 G/DL (ref 11.1–15.9)
IMM GRANULOCYTES # BLD AUTO: 0 X10E3/UL (ref 0–0.1)
IMM GRANULOCYTES NFR BLD AUTO: 0 %
IRON SATN MFR SERPL: 7 % (ref 15–55)
IRON SERPL-MCNC: 30 UG/DL (ref 27–159)
LYMPHOCYTES # BLD AUTO: 2.3 X10E3/UL (ref 0.7–3.1)
LYMPHOCYTES NFR BLD AUTO: 23 %
MCH RBC QN AUTO: 31.1 PG (ref 26.6–33)
MCHC RBC AUTO-ENTMCNC: 32.9 G/DL (ref 31.5–35.7)
MCV RBC AUTO: 94 FL (ref 79–97)
MONOCYTES # BLD AUTO: 0.7 X10E3/UL (ref 0.1–0.9)
MONOCYTES NFR BLD AUTO: 7 %
NEUTROPHILS # BLD AUTO: 6.4 X10E3/UL (ref 1.4–7)
NEUTROPHILS NFR BLD AUTO: 64 %
PLATELET # BLD AUTO: 413 X10E3/UL (ref 150–450)
POTASSIUM SERPL-SCNC: 4 MMOL/L (ref 3.5–5.2)
PROT SERPL-MCNC: 6.8 G/DL (ref 6–8.5)
RBC # BLD AUTO: 3.67 X10E6/UL (ref 3.77–5.28)
SODIUM SERPL-SCNC: 139 MMOL/L (ref 134–144)
TIBC SERPL-MCNC: 460 UG/DL (ref 250–450)
UIBC SERPL-MCNC: 430 UG/DL (ref 131–425)
VIT B12 SERPL-MCNC: 312 PG/ML (ref 232–1245)
WBC # BLD AUTO: 10 X10E3/UL (ref 3.4–10.8)

## 2022-06-14 RX ORDER — LANOLIN ALCOHOL/MO/W.PET/CERES
325 CREAM (GRAM) TOPICAL
Qty: 90 TABLET | Refills: 2 | Status: SHIPPED | OUTPATIENT
Start: 2022-06-14

## 2022-06-14 NOTE — PROGRESS NOTES
I have reviewed all lab results which showed Iron deficiency. I will send in a prescription for iron. Please notify the patient.

## 2022-06-25 ENCOUNTER — HOSPITAL ENCOUNTER (EMERGENCY)
Age: 42
Discharge: HOME | End: 2022-06-25
Attending: EMERGENCY MEDICINE
Payer: MEDICAID

## 2022-06-25 VITALS
RESPIRATION RATE: 16 BRPM | WEIGHT: 100 LBS | OXYGEN SATURATION: 99 % | SYSTOLIC BLOOD PRESSURE: 150 MMHG | BODY MASS INDEX: 20.16 KG/M2 | HEART RATE: 80 BPM | DIASTOLIC BLOOD PRESSURE: 89 MMHG | TEMPERATURE: 98.2 F | HEIGHT: 59 IN

## 2022-06-25 DIAGNOSIS — R51.9 ACUTE NONINTRACTABLE HEADACHE, UNSPECIFIED HEADACHE TYPE: Primary | ICD-10-CM

## 2022-06-25 LAB
ALBUMIN SERPL-MCNC: 4.5 G/DL (ref 3.4–5)
ALBUMIN/GLOB SERPL: 1.2 {RATIO} (ref 0.8–1.7)
ALP SERPL-CCNC: 66 U/L (ref 45–117)
ALT SERPL-CCNC: 18 U/L (ref 13–56)
ANION GAP SERPL CALC-SCNC: 8 MMOL/L (ref 3–18)
AST SERPL W P-5'-P-CCNC: 19 U/L (ref 10–38)
BASOPHILS # BLD: 0.1 K/UL (ref 0–0.1)
BASOPHILS NFR BLD: 1 % (ref 0–2)
BILIRUB SERPL-MCNC: 0.4 MG/DL (ref 0.2–1)
BUN SERPL-MCNC: 14 MG/DL (ref 7–18)
BUN/CREAT SERPL: 24 (ref 12–20)
CA-I BLD-MCNC: 9.5 MG/DL (ref 8.5–10.1)
CHLORIDE SERPL-SCNC: 99 MMOL/L (ref 100–111)
CO2 SERPL-SCNC: 28 MMOL/L (ref 21–32)
CREAT SERPL-MCNC: 0.59 MG/DL (ref 0.6–1.3)
DIFFERENTIAL METHOD BLD: ABNORMAL
EOSINOPHIL # BLD: 0.1 K/UL (ref 0–0.4)
EOSINOPHIL NFR BLD: 1 % (ref 0–5)
ERYTHROCYTE [DISTWIDTH] IN BLOOD BY AUTOMATED COUNT: 14.6 % (ref 11.6–14.5)
GLOBULIN SER CALC-MCNC: 3.8 G/DL (ref 2–4)
GLUCOSE SERPL-MCNC: 96 MG/DL (ref 74–99)
HCT VFR BLD AUTO: 37.7 % (ref 35–45)
HGB BLD-MCNC: 12.5 G/DL (ref 12–16)
IMM GRANULOCYTES # BLD AUTO: 0 K/UL (ref 0–0.04)
IMM GRANULOCYTES NFR BLD AUTO: 0 % (ref 0–0.5)
LYMPHOCYTES # BLD: 3.4 K/UL (ref 0.9–3.6)
LYMPHOCYTES NFR BLD: 21 % (ref 21–52)
MCH RBC QN AUTO: 31.6 PG (ref 24–34)
MCHC RBC AUTO-ENTMCNC: 33.2 G/DL (ref 31–37)
MCV RBC AUTO: 95.2 FL (ref 78–100)
MONOCYTES # BLD: 1.2 K/UL (ref 0.05–1.2)
MONOCYTES NFR BLD: 7 % (ref 3–10)
NEUTS SEG # BLD: 11.2 K/UL (ref 1.8–8)
NEUTS SEG NFR BLD: 70 % (ref 40–73)
NRBC # BLD: 0 K/UL (ref 0–0.01)
NRBC BLD-RTO: 0 PER 100 WBC
PLATELET # BLD AUTO: 426 K/UL (ref 135–420)
PMV BLD AUTO: 10.4 FL (ref 9.2–11.8)
POTASSIUM SERPL-SCNC: 3.4 MMOL/L (ref 3.5–5.5)
PROT SERPL-MCNC: 8.3 G/DL (ref 6.4–8.2)
RBC # BLD AUTO: 3.96 M/UL (ref 4.2–5.3)
SODIUM SERPL-SCNC: 135 MMOL/L (ref 136–145)
WBC # BLD AUTO: 16 K/UL (ref 4.6–13.2)

## 2022-06-25 PROCEDURE — 74011000258 HC RX REV CODE- 258: Performed by: EMERGENCY MEDICINE

## 2022-06-25 PROCEDURE — 99284 EMERGENCY DEPT VISIT MOD MDM: CPT

## 2022-06-25 PROCEDURE — 74011250636 HC RX REV CODE- 250/636: Performed by: EMERGENCY MEDICINE

## 2022-06-25 PROCEDURE — 85025 COMPLETE CBC W/AUTO DIFF WBC: CPT

## 2022-06-25 PROCEDURE — 96375 TX/PRO/DX INJ NEW DRUG ADDON: CPT

## 2022-06-25 PROCEDURE — 80053 COMPREHEN METABOLIC PANEL: CPT

## 2022-06-25 PROCEDURE — 96374 THER/PROPH/DIAG INJ IV PUSH: CPT

## 2022-06-25 RX ORDER — BUTALBITAL, ACETAMINOPHEN AND CAFFEINE 50; 325; 40 MG/1; MG/1; MG/1
1 TABLET ORAL
Qty: 20 TABLET | Refills: 0 | Status: SHIPPED | OUTPATIENT
Start: 2022-06-25

## 2022-06-25 RX ORDER — DIPHENHYDRAMINE HYDROCHLORIDE 50 MG/ML
25 INJECTION, SOLUTION INTRAMUSCULAR; INTRAVENOUS ONCE
Status: COMPLETED | OUTPATIENT
Start: 2022-06-25 | End: 2022-06-25

## 2022-06-25 RX ORDER — DEXAMETHASONE SODIUM PHOSPHATE 4 MG/ML
10 INJECTION, SOLUTION INTRA-ARTICULAR; INTRALESIONAL; INTRAMUSCULAR; INTRAVENOUS; SOFT TISSUE ONCE
Status: COMPLETED | OUTPATIENT
Start: 2022-06-25 | End: 2022-06-25

## 2022-06-25 RX ORDER — KETOROLAC TROMETHAMINE 30 MG/ML
15 INJECTION, SOLUTION INTRAMUSCULAR; INTRAVENOUS
Status: COMPLETED | OUTPATIENT
Start: 2022-06-25 | End: 2022-06-25

## 2022-06-25 RX ADMIN — KETOROLAC TROMETHAMINE 15 MG: 30 INJECTION, SOLUTION INTRAMUSCULAR at 14:31

## 2022-06-25 RX ADMIN — DIPHENHYDRAMINE HYDROCHLORIDE 25 MG: 50 INJECTION, SOLUTION INTRAMUSCULAR; INTRAVENOUS at 14:30

## 2022-06-25 RX ADMIN — DEXAMETHASONE SODIUM PHOSPHATE 10 MG: 4 INJECTION, SOLUTION INTRAMUSCULAR; INTRAVENOUS at 14:32

## 2022-06-25 RX ADMIN — PROCHLORPERAZINE EDISYLATE 10 MG: 5 INJECTION INTRAMUSCULAR; INTRAVENOUS at 14:41

## 2022-06-25 NOTE — ED TRIAGE NOTES
Pt states that she has a hx of headaches and started with pain last pm. Pt states that she took tylenol/motrin this am and it does not work

## 2022-06-25 NOTE — ED PROVIDER NOTES
Patient is a 53-year-old woman with past medical history significant for anxiety, hypertension, episodes of hypokalemia, kidney disease and rhabdomyolysis, who presents to the ED today with a headache that began last night. She states that she does have a history of headaches but has not had one in a while. She has positive photophobia, positive nausea and vomiting. She does not have any photophobia. The headache is located behind her left eye. She is also complaining of some muscle cramping where she states that her hands are tingly and it hurts to walk. She is also concerned that her potassium might be low. She has tried Tylenol, has taken her BP meds in bed and has also tried Motrin before coming to the ED. She denies any fevers, chills or stiff neck.            Past Medical History:   Diagnosis Date    Anxiety     GERD (gastroesophageal reflux disease)     Hypertension     Hypokalemia     Kidney disease     Rhabdomyolysis        Past Surgical History:   Procedure Laterality Date    HX  SECTION      x2, cervical biopsy, HPV         Family History:   Problem Relation Age of Onset    Hypertension Mother    [facility] Anxiety Mother     Stroke Maternal Grandmother     Cancer Maternal Grandfather     Heart Disease Paternal Grandmother        Social History     Socioeconomic History    Marital status:      Spouse name: Not on file    Number of children: Not on file    Years of education: Not on file    Highest education level: Not on file   Occupational History    Not on file   Tobacco Use    Smoking status: Current Every Day Smoker     Packs/day: 0.50    Smokeless tobacco: Never Used   Vaping Use    Vaping Use: Not on file   Substance and Sexual Activity    Alcohol use: Not Currently    Drug use: Not Currently    Sexual activity: Not on file   Other Topics Concern    Not on file   Social History Narrative    Not on file     Social Determinants of Health     Financial Resource Strain:     Difficulty of Paying Living Expenses: Not on file   Food Insecurity:     Worried About Running Out of Food in the Last Year: Not on file    Rolf of Food in the Last Year: Not on file   Transportation Needs:     Lack of Transportation (Medical): Not on file    Lack of Transportation (Non-Medical): Not on file   Physical Activity:     Days of Exercise per Week: Not on file    Minutes of Exercise per Session: Not on file   Stress:     Feeling of Stress : Not on file   Social Connections:     Frequency of Communication with Friends and Family: Not on file    Frequency of Social Gatherings with Friends and Family: Not on file    Attends Christianity Services: Not on file    Active Member of [address] [place] [address] or Organizations: Not on file    Attends Club or Organization Meetings: Not on file    Marital Status: Not on file   Intimate Partner Violence:     Fear of Current or Ex-Partner: Not on file    Emotionally Abused: Not on file    Physically Abused: Not on file    Sexually Abused: Not on file   Housing Stability:     Unable to Pay for Housing in the Last Year: Not on file    Number of [place] in the Last Year: Not on file    Unstable Housing in the Last Year: Not on file         ALLERGIES: Pcn [penicillins] and Sulfa (sulfonamide antibiotics)    Review of Systems   All other systems reviewed and are negative. Vitals:    06/25/22 1243   BP: (!) 152/103   Pulse: 82   Resp: 16   Temp: 98.2 °F (36.8 °C)   SpO2: 98%   Weight: 45.4 kg (100 lb)   Height: 4' 11\" (1.499 m)            Physical Exam  Vitals and nursing note reviewed. Constitutional:       Comments: Extremely thin   HENT:      Head: Normocephalic and atraumatic. Right Ear: External ear normal.      Left Ear: External ear normal.      Nose: Nose normal.      Mouth/Throat:      Mouth: Mucous membranes are moist.      Pharynx: Oropharynx is clear.       Comments: Poor dentition  Eyes:      Extraocular Movements: Extraocular movements intact. Conjunctiva/sclera: Conjunctivae normal.      Pupils: Pupils are equal, round, and reactive to light. Cardiovascular:      Rate and Rhythm: Normal rate and regular rhythm. Pulses: Normal pulses. Heart sounds: Normal heart sounds. Pulmonary:      Effort: Pulmonary effort is normal.      Breath sounds: Normal breath sounds. Abdominal:      General: Abdomen is flat. Bowel sounds are normal.      Palpations: Abdomen is soft. Tenderness: There is left CVA tenderness. Musculoskeletal:         General: Normal range of motion. Cervical back: Normal range of motion and neck supple. Skin:     General: Skin is warm and dry. Capillary Refill: Capillary refill takes less than 2 seconds. Neurological:      Mental Status: She is alert and oriented to person, place, and time. Cranial Nerves: No cranial nerve deficit. Sensory: No sensory deficit. Motor: No weakness. Coordination: Coordination normal.      Gait: Gait normal.      Deep Tendon Reflexes: Reflexes normal.   Psychiatric:      Comments: Somewhat anxious            Recent Results (from the past 12 hour(s))   CBC WITH AUTOMATED DIFF    Collection Time: 06/25/22  1:52 PM   Result Value Ref Range    WBC 16.0 (H) 4.6 - 13.2 K/uL    RBC 3.96 (L) 4.20 - 5.30 M/uL    HGB 12.5 12.0 - 16.0 g/dL    HCT 37.7 35.0 - 45.0 %    MCV 95.2 78.0 - 100.0 FL    MCH 31.6 24.0 - 34.0 PG    MCHC 33.2 31.0 - 37.0 g/dL    RDW 14.6 (H) 11.6 - 14.5 %    PLATELET 743 (H) 407 - 420 K/uL    MPV 10.4 9.2 - 11.8 FL    NRBC 0.0 0.0  WBC    ABSOLUTE NRBC 0.00 0.00 - 0.01 K/uL    NEUTROPHILS 70 40 - 73 %    LYMPHOCYTES 21 21 - 52 %    MONOCYTES 7 3 - 10 %    EOSINOPHILS 1 0 - 5 %    BASOPHILS 1 0 - 2 %    IMMATURE GRANULOCYTES 0 0 - 0.5 %    ABS. NEUTROPHILS 11.2 (H) 1.8 - 8.0 K/UL    ABS. LYMPHOCYTES 3.4 0.9 - 3.6 K/UL    ABS. MONOCYTES 1.2 0.05 - 1.2 K/UL    ABS. EOSINOPHILS 0.1 0.0 - 0.4 K/UL    ABS. BASOPHILS 0.1 0.0 - 0.1 K/UL    ABS. IMM. GRANS. 0.0 0.00 - 0.04 K/UL    DF AUTOMATED     METABOLIC PANEL, COMPREHENSIVE    Collection Time: 06/25/22  1:52 PM   Result Value Ref Range    Sodium 135 (L) 136 - 145 mmol/L    Potassium 3.4 (L) 3.5 - 5.5 mmol/L    Chloride 99 (L) 100 - 111 mmol/L    CO2 28 21 - 32 mmol/L    Anion gap 8 3.0 - 18.0 mmol/L    Glucose 96 74 - 99 mg/dL    BUN 14 7 - 18 mg/dL    Creatinine 0.59 (L) 0.60 - 1.30 mg/dL    BUN/Creatinine ratio 24 (H) 12 - 20      GFR est AA >60 >60 ml/min/1.73m2    GFR est non-AA >60 >60 ml/min/1.73m2    Calcium 9.5 8.5 - 10.1 mg/dL    Bilirubin, total 0.4 0.2 - 1.0 mg/dL    AST (SGOT) 19 10 - 38 U/L    ALT (SGPT) 18 13 - 56 U/L    Alk. phosphatase 66 45 - 117 U/L    Protein, total 8.3 (H) 6.4 - 8.2 g/dL    Albumin 4.5 3.4 - 5.0 g/dL    Globulin 3.8 2.0 - 4.0 g/dL    A-G Ratio 1.2 0.8 - 1.7         MDM  Number of Diagnoses or Management Options  Acute nonintractable headache, unspecified headache type  Diagnosis management comments: The patient is a 51-year-old woman with past medical history as listed above, who also appears to have a history of substance abuse, who presented to the ED today with a headache. She does have a prior history of headaches but has not had one in a long time. She is also concerned about her potassium given the fact that she has a history of hypokalemia and she is having muscle cramping. Her labs show that she does not have hypokalemia. She does have an elevated white blood cell count but I believe that is secondary to nausea and vomiting. I do not see any other signs of infection at this time. She has received IV Toradol, IV Decadron, IV Benadryl and IV Compazine in the ED. She is feeling much better. She will be discharged home with a prescription for Fioricet. She has been advised to follow-up with her primary care physician in 2 to 3 days. Return precautions have been given.          Procedures

## 2022-07-11 ENCOUNTER — TRANSCRIBE ORDER (OUTPATIENT)
Dept: SCHEDULING | Age: 42
End: 2022-07-11

## 2022-07-11 DIAGNOSIS — R00.2 PALPITATIONS: Primary | ICD-10-CM

## 2023-02-13 DIAGNOSIS — R94.31 ABNORMAL EKG: Primary | ICD-10-CM

## 2023-03-18 ENCOUNTER — HOSPITAL ENCOUNTER (EMERGENCY)
Age: 43
Discharge: HOME | End: 2023-03-18
Attending: INTERNAL MEDICINE
Payer: MEDICAID

## 2023-03-18 ENCOUNTER — APPOINTMENT (OUTPATIENT)
Age: 43
End: 2023-03-18
Payer: MEDICAID

## 2023-03-18 VITALS
RESPIRATION RATE: 18 BRPM | WEIGHT: 104 LBS | HEIGHT: 58 IN | TEMPERATURE: 98.8 F | HEART RATE: 74 BPM | SYSTOLIC BLOOD PRESSURE: 136 MMHG | DIASTOLIC BLOOD PRESSURE: 86 MMHG | OXYGEN SATURATION: 100 % | BODY MASS INDEX: 21.83 KG/M2

## 2023-03-18 DIAGNOSIS — S29.011A MUSCLE STRAIN OF CHEST WALL, INITIAL ENCOUNTER: Primary | ICD-10-CM

## 2023-03-18 LAB
ALBUMIN SERPL-MCNC: 3.5 G/DL (ref 3.4–5)
ALBUMIN/GLOB SERPL: 0.9 (ref 0.8–1.7)
ALP SERPL-CCNC: 68 U/L (ref 45–117)
ALT SERPL-CCNC: 15 U/L (ref 13–56)
ANION GAP SERPL CALC-SCNC: 7 MMOL/L (ref 3–18)
APPEARANCE UR: CLEAR
AST SERPL W P-5'-P-CCNC: 14 U/L (ref 10–38)
BASOPHILS # BLD: 0.2 K/UL (ref 0–0.1)
BASOPHILS NFR BLD: 1 % (ref 0–2)
BILIRUB SERPL-MCNC: 0.2 MG/DL (ref 0.2–1)
BILIRUB UR QL: NEGATIVE
BUN SERPL-MCNC: 11 MG/DL (ref 7–18)
BUN/CREAT SERPL: 17 (ref 12–20)
CA-I BLD-MCNC: 8.7 MG/DL (ref 8.5–10.1)
CHLORIDE SERPL-SCNC: 108 MMOL/L (ref 100–111)
CO2 SERPL-SCNC: 23 MMOL/L (ref 21–32)
COLOR UR: YELLOW
CREAT SERPL-MCNC: 0.63 MG/DL (ref 0.6–1.3)
DIFFERENTIAL METHOD BLD: ABNORMAL
EOSINOPHIL # BLD: 0.4 K/UL (ref 0–0.4)
EOSINOPHIL NFR BLD: 3 % (ref 0–5)
ERYTHROCYTE [DISTWIDTH] IN BLOOD BY AUTOMATED COUNT: 16.8 % (ref 11.6–14.5)
GLOBULIN SER CALC-MCNC: 3.8 G/DL (ref 2–4)
GLUCOSE SERPL-MCNC: 94 MG/DL (ref 74–99)
GLUCOSE UR STRIP.AUTO-MCNC: NEGATIVE MG/DL
HCG SERPL QL: NEGATIVE
HCG UR QL: NEGATIVE
HCT VFR BLD AUTO: 34.9 % (ref 35–45)
HGB BLD-MCNC: 10.8 G/DL (ref 12–16)
HGB UR QL STRIP: NEGATIVE
IMM GRANULOCYTES # BLD AUTO: 0.1 K/UL (ref 0–0.04)
IMM GRANULOCYTES NFR BLD AUTO: 0 % (ref 0–0.5)
KETONES UR QL STRIP.AUTO: NEGATIVE MG/DL
LACTATE SERPL-SCNC: 0.6 MMOL/L (ref 0.4–2)
LEUKOCYTE ESTERASE UR QL STRIP.AUTO: NEGATIVE
LIPASE SERPL-CCNC: 126 U/L (ref 73–393)
LIPASE SERPL-CCNC: 130 U/L (ref 73–393)
LYMPHOCYTES # BLD: 2.7 K/UL (ref 0.9–3.6)
LYMPHOCYTES NFR BLD: 17 % (ref 21–52)
MCH RBC QN AUTO: 28.3 PG (ref 24–34)
MCHC RBC AUTO-ENTMCNC: 30.9 G/DL (ref 31–37)
MCV RBC AUTO: 91.6 FL (ref 78–100)
MONOCYTES # BLD: 1.1 K/UL (ref 0.05–1.2)
MONOCYTES NFR BLD: 7 % (ref 3–10)
NEUTS SEG # BLD: 11.2 K/UL (ref 1.8–8)
NEUTS SEG NFR BLD: 72 % (ref 40–73)
NITRITE UR QL STRIP.AUTO: NEGATIVE
NRBC # BLD: 0 K/UL (ref 0–0.01)
NRBC BLD-RTO: 0 PER 100 WBC
PH UR STRIP: 7 (ref 5–8)
PLATELET # BLD AUTO: 360 K/UL (ref 135–420)
PMV BLD AUTO: 10.3 FL (ref 9.2–11.8)
POTASSIUM SERPL-SCNC: 4 MMOL/L (ref 3.5–5.5)
PROT SERPL-MCNC: 7.3 G/DL (ref 6.4–8.2)
PROT UR STRIP-MCNC: NEGATIVE MG/DL
RBC # BLD AUTO: 3.81 M/UL (ref 4.2–5.3)
SODIUM SERPL-SCNC: 138 MMOL/L (ref 136–145)
SP GR UR REFRACTOMETRY: 1 (ref 1–1.03)
UROBILINOGEN UR QL STRIP.AUTO: 0.2 EU/DL (ref 0.2–1)
WBC # BLD AUTO: 15.5 K/UL (ref 4.6–13.2)

## 2023-03-18 PROCEDURE — 83605 ASSAY OF LACTIC ACID: CPT

## 2023-03-18 PROCEDURE — 80053 COMPREHEN METABOLIC PANEL: CPT

## 2023-03-18 PROCEDURE — 6360000002 HC RX W HCPCS: Performed by: INTERNAL MEDICINE

## 2023-03-18 PROCEDURE — 85025 COMPLETE CBC W/AUTO DIFF WBC: CPT

## 2023-03-18 PROCEDURE — 81003 URINALYSIS AUTO W/O SCOPE: CPT

## 2023-03-18 PROCEDURE — 83690 ASSAY OF LIPASE: CPT

## 2023-03-18 PROCEDURE — 99285 EMERGENCY DEPT VISIT HI MDM: CPT

## 2023-03-18 PROCEDURE — 6370000000 HC RX 637 (ALT 250 FOR IP): Performed by: INTERNAL MEDICINE

## 2023-03-18 PROCEDURE — 74177 CT ABD & PELVIS W/CONTRAST: CPT

## 2023-03-18 PROCEDURE — 96374 THER/PROPH/DIAG INJ IV PUSH: CPT

## 2023-03-18 PROCEDURE — 81025 URINE PREGNANCY TEST: CPT

## 2023-03-18 PROCEDURE — 6360000004 HC RX CONTRAST MEDICATION: Performed by: INTERNAL MEDICINE

## 2023-03-18 PROCEDURE — 84703 CHORIONIC GONADOTROPIN ASSAY: CPT

## 2023-03-18 PROCEDURE — 71046 X-RAY EXAM CHEST 2 VIEWS: CPT

## 2023-03-18 RX ORDER — METHOCARBAMOL 750 MG/1
750 TABLET, FILM COATED ORAL 3 TIMES DAILY
Qty: 20 TABLET | Refills: 0 | Status: SHIPPED | OUTPATIENT
Start: 2023-03-18 | End: 2023-03-25

## 2023-03-18 RX ORDER — MORPHINE SULFATE 2 MG/ML
2 INJECTION, SOLUTION INTRAMUSCULAR; INTRAVENOUS
Status: COMPLETED | OUTPATIENT
Start: 2023-03-18 | End: 2023-03-18

## 2023-03-18 RX ORDER — LIDOCAINE 50 MG/G
1 PATCH TOPICAL DAILY
Qty: 10 PATCH | Refills: 0 | Status: SHIPPED | OUTPATIENT
Start: 2023-03-18 | End: 2023-03-28

## 2023-03-18 RX ORDER — NAPROXEN 500 MG/1
500 TABLET ORAL 2 TIMES DAILY
Qty: 20 TABLET | Refills: 0 | Status: SHIPPED | OUTPATIENT
Start: 2023-03-18

## 2023-03-18 RX ORDER — BENZONATATE 100 MG/1
100 CAPSULE ORAL
Status: COMPLETED | OUTPATIENT
Start: 2023-03-18 | End: 2023-03-18

## 2023-03-18 RX ADMIN — IOPAMIDOL 85 ML: 755 INJECTION, SOLUTION INTRAVENOUS at 13:47

## 2023-03-18 RX ADMIN — BENZONATATE 100 MG: 100 CAPSULE ORAL at 13:52

## 2023-03-18 RX ADMIN — MORPHINE SULFATE 2 MG: 2 INJECTION, SOLUTION INTRAMUSCULAR; INTRAVENOUS at 14:05

## 2023-03-18 ASSESSMENT — PAIN SCALES - GENERAL
PAINLEVEL_OUTOF10: 10
PAINLEVEL_OUTOF10: 5

## 2023-03-18 ASSESSMENT — PAIN DESCRIPTION - ORIENTATION: ORIENTATION: RIGHT

## 2023-03-18 ASSESSMENT — ENCOUNTER SYMPTOMS
DIARRHEA: 0
CONSTIPATION: 0
ABDOMINAL PAIN: 1
VOMITING: 0
COUGH: 0
CHEST TIGHTNESS: 0
SHORTNESS OF BREATH: 0
NAUSEA: 0
TROUBLE SWALLOWING: 0

## 2023-03-18 ASSESSMENT — PAIN DESCRIPTION - LOCATION
LOCATION: RIB CAGE

## 2023-03-18 ASSESSMENT — LIFESTYLE VARIABLES
HOW OFTEN DO YOU HAVE A DRINK CONTAINING ALCOHOL: NEVER
HOW MANY STANDARD DRINKS CONTAINING ALCOHOL DO YOU HAVE ON A TYPICAL DAY: PATIENT DOES NOT DRINK

## 2023-03-18 ASSESSMENT — PAIN - FUNCTIONAL ASSESSMENT
PAIN_FUNCTIONAL_ASSESSMENT: 0-10

## 2023-03-18 NOTE — ED NOTES
Patient states improvement in rib pain. Rates pain at 5/10 at rest.  Denies needs or concerns. Family member at bedside.       Talia Garza RN  03/18/23 8826

## 2023-03-18 NOTE — ED TRIAGE NOTES
Patient states having cough x one week. Patient c/o right upper quadrant abdominal pain that began today. She states feeling something \"pop\" in right lateral ribs during a cough today. States pain with inspiration following the \"pop\" incident.

## 2023-03-18 NOTE — ED PROVIDER NOTES
[facility] EMERGENCY DEPT  EMERGENCY DEPARTMENT ENCOUNTER      Pt Name: Leo Villagomez  MRN: [id number]  [place] 1980  Date of evaluation: 3/18/2023  Provider: Rene Lofton MD    [address] [place] [address]       Chief Complaint   Patient presents with    Rib Pain    Abdominal Pain    Cough         HISTORY OF PRESENT ILLNESS   (Location/Symptom, Timing/Onset, Context/Setting, Quality, Duration, Modifying Factors, Severity)  Note limiting factors. Leo Villagomez is a 41 y.o. female who presents to the emergency department      40 yo f that presents with gradual onset sharp pain in the right inframammary area that began 2 d ago and she thought it was related to work. States that today, she felt a pop in the area and the pain moved to the right flank. States that the pain is always there but is worse with deep breathing;  coughing or blowing her nose. No prior hx of this pain. No fever; chills; purulent cough; hemoptysis; dysuria; h/o kidney stones or biliary / liver disease. No n/v/d/c. Has h/o  rhabdomyolysis. She works at [employer] and states that she has been having to do a lot of lifting and cleaning lately which may have aggravated her back. The history is provided by the patient. Nursing Notes were reviewed. REVIEW OF SYSTEMS    (2-9 systems for level 4, 10 or more for level 5)     Review of Systems   Constitutional:  Negative for chills and fever. HENT:  Negative for trouble swallowing. Respiratory:  Negative for cough, chest tightness and shortness of breath. Cardiovascular:  Negative for chest pain. Gastrointestinal:  Positive for abdominal pain. Negative for constipation, diarrhea, nausea and vomiting. Genitourinary:  Positive for flank pain. Negative for dysuria, hematuria and urgency. Musculoskeletal:  Negative for arthralgias. Skin:  Negative for rash. Neurological:  Negative for headaches. Psychiatric/Behavioral:  Negative for confusion.       Except as noted above the remainder of the review of systems was reviewed and negative. PAST MEDICAL HISTORY     Past Medical History:   Diagnosis Date    Anxiety     GERD (gastroesophageal reflux disease)     Hypertension     Hypokalemia     Kidney disease     Rhabdomyolysis          SURGICAL HISTORY       Past Surgical History:   Procedure Laterality Date     SECTION      x2, cervical biopsy, HPV         CURRENT MEDICATIONS       Previous Medications    ATENOLOL (TENORMIN) 50 MG TABLET    Take 50 mg by mouth daily    OMEPRAZOLE (PRILOSEC) 20 MG DELAYED RELEASE CAPSULE    Take 20 mg by mouth daily    SPIRONOLACTONE (ALDACTONE) 25 MG TABLET    Take 25 mg by mouth daily       ALLERGIES     Penicillins and Sulfa antibiotics    FAMILY HISTORY       Family History   Problem Relation Age of Onset    Anxiety Disorder Mother     Stroke Maternal Grandmother     Cancer Maternal Grandfather     Heart Disease Paternal Grandmother     Hypertension Mother           SOCIAL HISTORY       Social History     Socioeconomic History    Marital status:      Spouse name: None    Number of children: None    Years of education: None    Highest education level: None   Tobacco Use    Smoking status: Every Day     Packs/day: 0.50     Types: Cigarettes    Smokeless tobacco: Never   Substance and Sexual Activity    Alcohol use: Not Currently    Drug use: Not Currently       SCREENINGS         [place] Coma Scale  Eye Opening: Spontaneous  Best Verbal Response: Oriented  Best Motor Response: Obeys commands  [place] Coma Scale Score: 15                     CIWA Assessment  BP: (!) 140/85  Heart Rate: 77                 PHYSICAL EXAM    (up to 7 for level 4, 8 or more for level 5)     ED Triage Vitals [23 1222]   BP Temp Temp Source Heart Rate Resp SpO2 Height Weight   138/87 98.8 °F (37.1 °C) Oral 82 18 100 % 4' 10\" (1.473 m) 104 lb (47.2 kg)       Physical Exam  Vitals and nursing note reviewed.    Constitutional:       General: She is not in acute distress. Appearance: Normal appearance. She is not ill-appearing or toxic-appearing. HENT:      Mouth/Throat:      Pharynx: Oropharynx is clear. Eyes:      Extraocular Movements: Extraocular movements intact. Conjunctiva/sclera: Conjunctivae normal.   Cardiovascular:      Rate and Rhythm: Regular rhythm. Heart sounds: Normal heart sounds. No murmur heard. Pulmonary:      Effort: Pulmonary effort is normal. No respiratory distress. Breath sounds: Normal breath sounds. No wheezing or rales. Abdominal:      General: Abdomen is scaphoid. There is no distension. Palpations: Abdomen is soft. Tenderness: There is abdominal tenderness. Comments: Mild TTP in the right inframammary area w/o skin change; no guarding or rebound; no ttp in gallbladder area   Musculoskeletal:         General: Normal range of motion. Cervical back: Neck supple. Skin:     General: Skin is warm. Neurological:      Mental Status: She is alert and oriented to person, place, and time. Psychiatric:         Behavior: Behavior normal.       DIAGNOSTIC RESULTS     EKG: All EKG's are interpreted by the Emergency Department Physician who either signs or Co-signs this chart in the absence of a cardiologist.    RADIOLOGY:   Non-plain film images such as CT, Ultrasound and MRI are read by the radiologist. Plain radiographic images are visualized and preliminarily interpreted by the emergency physician with the below findings:    Interpretation per the Radiologist below, if available at the time of this note:    XR CHEST (2 VW)   Final Result   No acute cardiopulmonary disease. CT ABDOMEN PELVIS W IV CONTRAST Additional Contrast? None   Final Result      1. Left ovarian cyst measuring 3 cm with a small volume of free fluid in the   pelvis. 2. No other acute abdominal or pelvic pathology. 3. Diffuse calcific aortic atherosclerosis.             ED BEDSIDE ULTRASOUND:   Performed by ED Physician - none    LABS:  Labs Reviewed   CBC WITH AUTO DIFFERENTIAL - Abnormal; Notable for the following components:       Result Value    WBC 15.5 (*)     RBC 3.81 (*)     Hemoglobin 10.8 (*)     Hematocrit 34.9 (*)     MCHC 30.9 (*)     RDW 16.8 (*)     Lymphocytes 17 (*)     Segs Absolute 11.2 (*)     Basophils Absolute 0.2 (*)     Absolute Immature Granulocyte 0.1 (*)     All other components within normal limits   URINALYSIS   PREGNANCY, URINE   COMPREHENSIVE METABOLIC PANEL   LIPASE   HCG, SERUM, QUALITATIVE   LIPASE   LACTIC ACID       All other labs were within normal range or not returned as of this dictation. EMERGENCY DEPARTMENT COURSE and DIFFERENTIAL DIAGNOSIS/MDM:   Vitals:    Vitals:    03/18/23 1222 03/18/23 1315 03/18/23 1328 03/18/23 1354   BP: 138/87  (!) 152/80 (!) 140/85   Pulse: 82 78 79 77   Resp: 18 18  20   Temp: 98.8 °F (37.1 °C)      TempSrc: Oral      SpO2: 100% 100%  100%   Weight: 104 lb (47.2 kg)      Height: 4' 10\" (1.473 m)            Medical Decision Making  Amount and/or Complexity of Data Reviewed  Labs: ordered. Radiology: ordered. Risk  Prescription drug management. 40 yo f with gradual ruq abd pain possible gallstone; pancreatitis; hepatic; renal; muscle sprain; pulmonary. Doubt cardiac        REASSESSMENT      2:55 PM  CT and labs reviewed with patient. She seems to describe this pain to work activities. She is noted to have an elevated white count but had this when she came in June of last year. Her chest x-ray shows no evidence of pneumonia. Her CT scan shows a 3 cm left ovarian cyst but her pain is on the right upper quadrant. She has a normal lactic acid, normal CMP, normal lipase, normal ECG, normal urinalysis. We will treat patient as a muscle strain but she was advised that if she worsened to return for reevaluation. CRITICAL CARE TIME       CONSULTS:  None    PROCEDURES:  Unless otherwise noted below, none     Procedures    FINAL IMPRESSION      1.  Muscle strain of chest wall, initial encounter          DISPOSITION/PLAN   DISPOSITION Decision To Discharge 03/18/2023 02:53:01 PM      PATIENT REFERRED TO:  MD Marixa Azevedo [address] [place] [address]  [phone]    Schedule an appointment as soon as possible for a visit in 3 days      DISCHARGE MEDICATIONS:  New Prescriptions    LIDOCAINE (LIDODERM) 5 %    Place 1 patch onto the skin daily for 10 days 12 hours on, 12 hours off. METHOCARBAMOL (ROBAXIN-750) 750 MG TABLET    Take 1 tablet by mouth 3 times daily for 7 days    NAPROXEN (NAPROSYN) 500 MG TABLET    Take 1 tablet by mouth 2 times daily     Controlled Substances Monitoring:     No flowsheet data found.     (Please note that portions of this note were completed with a voice recognition program.  Efforts were made to edit the dictations but occasionally words are mis-transcribed.)    Louis George MD (electronically signed)  Attending Emergency Physician           Louis George MD  03/18/23 [phone]

## 2023-03-18 NOTE — Clinical Note
Justino Hou was seen and treated in our emergency department on 3/18/2023. She may return to work on 03/21/2023. If you have any questions or concerns, please don't hesitate to call.       Dario Garcia MD

## 2023-03-18 NOTE — ED NOTES
Discharge instructions reviewed with patient. Patient verbalized understanding. Patient advised to follow up as directed on discharge instructions. Patient denies questions, needs or concerns at this time. Patient verbalized understanding. No s/sx of distress noted.        Taiwo Garza RN  03/18/23 [id number]

## 2023-03-31 RX ORDER — ATENOLOL 50 MG/1
50 TABLET ORAL DAILY
Qty: 30 TABLET | Refills: 0 | Status: SHIPPED | OUTPATIENT
Start: 2023-03-31

## 2023-03-31 RX ORDER — SPIRONOLACTONE 25 MG/1
25 TABLET ORAL DAILY
Qty: 30 TABLET | Refills: 0 | Status: SHIPPED | OUTPATIENT
Start: 2023-03-31

## 2023-04-11 DIAGNOSIS — D50.0 IRON DEFICIENCY ANEMIA DUE TO CHRONIC BLOOD LOSS: ICD-10-CM

## 2023-04-11 DIAGNOSIS — D72.829 LEUKOCYTOSIS, UNSPECIFIED TYPE: ICD-10-CM

## 2023-04-11 PROBLEM — G89.4 CHRONIC PAIN DISORDER: Status: ACTIVE | Noted: 2017-08-25

## 2023-04-11 PROBLEM — I70.0 ATHEROSCLEROSIS OF AORTA (HCC): Status: ACTIVE | Noted: 2023-04-11

## 2023-04-11 PROBLEM — F33.2 SEVERE EPISODE OF RECURRENT MAJOR DEPRESSIVE DISORDER, WITHOUT PSYCHOTIC FEATURES (HCC): Status: ACTIVE | Noted: 2018-08-10

## 2023-04-11 PROBLEM — F17.200 TOBACCO USE DISORDER, SEVERE, DEPENDENCE: Status: ACTIVE | Noted: 2017-08-26

## 2023-04-12 LAB
FERRITIN SERPL-MCNC: 15 NG/ML (ref 15–150)
HIV 1+2 AB+HIV1 P24 AG SERPL QL IA: NON REACTIVE
IRON SATN MFR SERPL: 27 % (ref 15–55)
IRON SERPL-MCNC: 123 UG/DL (ref 27–159)
TIBC SERPL-MCNC: 463 UG/DL (ref 250–450)
UIBC SERPL-MCNC: 340 UG/DL (ref 131–425)

## 2023-04-18 LAB
BASOPHILS # BLD AUTO: 0.1 X10E3/UL (ref 0–0.2)
BASOPHILS NFR BLD AUTO: 1 %
EOSINOPHIL # BLD AUTO: 0.4 X10E3/UL (ref 0–0.4)
EOSINOPHIL NFR BLD AUTO: 4 %
ERYTHROCYTE [DISTWIDTH] IN BLOOD BY AUTOMATED COUNT: 14.3 % (ref 11.7–15.4)
HCT VFR BLD AUTO: 37.6 % (ref 34–46.6)
HGB BLD-MCNC: 12.3 G/DL (ref 11.1–15.9)
IMM GRANULOCYTES # BLD AUTO: 0 X10E3/UL (ref 0–0.1)
IMM GRANULOCYTES NFR BLD AUTO: 0 %
LYMPHOCYTES # BLD AUTO: 2.1 X10E3/UL (ref 0.7–3.1)
LYMPHOCYTES NFR BLD AUTO: 20 %
MCH RBC QN AUTO: 27.2 PG (ref 26.6–33)
MCHC RBC AUTO-ENTMCNC: 32.7 G/DL (ref 31.5–35.7)
MCV RBC AUTO: 83 FL (ref 79–97)
MONOCYTES # BLD AUTO: 0.8 X10E3/UL (ref 0.1–0.9)
MONOCYTES NFR BLD AUTO: 8 %
NEUTROPHILS # BLD AUTO: 7 X10E3/UL (ref 1.4–7)
NEUTROPHILS NFR BLD AUTO: 67 %
PATH REV BLD -IMP: ABNORMAL
PATHOLOGIST NAME: ABNORMAL
PLATELET # BLD AUTO: 464 X10E3/UL (ref 150–450)
RBC # BLD AUTO: 4.53 X10E6/UL (ref 3.77–5.28)
WBC # BLD AUTO: 10.5 X10E3/UL (ref 3.4–10.8)

## 2023-05-19 ENCOUNTER — OFFICE VISIT (OUTPATIENT)
Facility: CLINIC | Age: 43
End: 2023-05-19
Payer: MEDICAID

## 2023-05-19 ENCOUNTER — TELEPHONE (OUTPATIENT)
Facility: CLINIC | Age: 43
End: 2023-05-19

## 2023-05-19 VITALS
HEART RATE: 66 BPM | SYSTOLIC BLOOD PRESSURE: 161 MMHG | BODY MASS INDEX: 20.02 KG/M2 | OXYGEN SATURATION: 98 % | DIASTOLIC BLOOD PRESSURE: 96 MMHG | TEMPERATURE: 98.1 F | HEIGHT: 58 IN | WEIGHT: 95.4 LBS | RESPIRATION RATE: 18 BRPM

## 2023-05-19 DIAGNOSIS — N92.0 MENORRHAGIA WITH REGULAR CYCLE: ICD-10-CM

## 2023-05-19 DIAGNOSIS — F41.1 GENERALIZED ANXIETY DISORDER: ICD-10-CM

## 2023-05-19 DIAGNOSIS — F10.90 HEAVY ALCOHOL USE: ICD-10-CM

## 2023-05-19 DIAGNOSIS — F17.200 TOBACCO USE DISORDER, SEVERE, DEPENDENCE: ICD-10-CM

## 2023-05-19 DIAGNOSIS — D50.0 IRON DEFICIENCY ANEMIA DUE TO CHRONIC BLOOD LOSS: ICD-10-CM

## 2023-05-19 DIAGNOSIS — J44.9 SUSPECTED CHRONIC OBSTRUCTIVE PULMONARY DISEASE BASED ON INITIAL EVALUATION (HCC): Primary | ICD-10-CM

## 2023-05-19 DIAGNOSIS — I10 ESSENTIAL HYPERTENSION: ICD-10-CM

## 2023-05-19 DIAGNOSIS — F33.2 SEVERE EPISODE OF RECURRENT MAJOR DEPRESSIVE DISORDER, WITHOUT PSYCHOTIC FEATURES (HCC): ICD-10-CM

## 2023-05-19 PROBLEM — D72.829 LEUKOCYTOSIS: Status: RESOLVED | Noted: 2023-04-11 | Resolved: 2023-05-19

## 2023-05-19 PROCEDURE — 3080F DIAST BP >= 90 MM HG: CPT | Performed by: STUDENT IN AN ORGANIZED HEALTH CARE EDUCATION/TRAINING PROGRAM

## 2023-05-19 PROCEDURE — 99214 OFFICE O/P EST MOD 30 MIN: CPT | Performed by: STUDENT IN AN ORGANIZED HEALTH CARE EDUCATION/TRAINING PROGRAM

## 2023-05-19 PROCEDURE — 3077F SYST BP >= 140 MM HG: CPT | Performed by: STUDENT IN AN ORGANIZED HEALTH CARE EDUCATION/TRAINING PROGRAM

## 2023-05-19 RX ORDER — TIOTROPIUM BROMIDE 18 UG/1
18 CAPSULE ORAL; RESPIRATORY (INHALATION) DAILY
Qty: 30 CAPSULE | Refills: 2 | Status: SHIPPED | OUTPATIENT
Start: 2023-05-19

## 2023-05-19 RX ORDER — FERROUS SULFATE 325(65) MG
325 TABLET ORAL EVERY OTHER DAY
Qty: 60 TABLET | Refills: 2 | Status: SHIPPED | OUTPATIENT
Start: 2023-05-19

## 2023-05-19 RX ORDER — ALBUTEROL SULFATE 90 UG/1
2 AEROSOL, METERED RESPIRATORY (INHALATION) EVERY 6 HOURS PRN
Qty: 18 G | Refills: 3 | Status: SHIPPED | OUTPATIENT
Start: 2023-05-19

## 2023-05-19 RX ORDER — DOCUSATE SODIUM 100 MG/1
100 CAPSULE, LIQUID FILLED ORAL 2 TIMES DAILY
Qty: 60 CAPSULE | Refills: 0 | Status: SHIPPED | OUTPATIENT
Start: 2023-05-19 | End: 2023-06-18

## 2023-05-19 ASSESSMENT — PATIENT HEALTH QUESTIONNAIRE - PHQ9
SUM OF ALL RESPONSES TO PHQ QUESTIONS 1-9: 0
2. FEELING DOWN, DEPRESSED OR HOPELESS: 0
8. MOVING OR SPEAKING SO SLOWLY THAT OTHER PEOPLE COULD HAVE NOTICED. OR THE OPPOSITE, BEING SO FIGETY OR RESTLESS THAT YOU HAVE BEEN MOVING AROUND A LOT MORE THAN USUAL: 0
SUM OF ALL RESPONSES TO PHQ9 QUESTIONS 1 & 2: 0
SUM OF ALL RESPONSES TO PHQ QUESTIONS 1-9: 0
10. IF YOU CHECKED OFF ANY PROBLEMS, HOW DIFFICULT HAVE THESE PROBLEMS MADE IT FOR YOU TO DO YOUR WORK, TAKE CARE OF THINGS AT HOME, OR GET ALONG WITH OTHER PEOPLE: 0
SUM OF ALL RESPONSES TO PHQ QUESTIONS 1-9: 0
SUM OF ALL RESPONSES TO PHQ9 QUESTIONS 1 & 2: 0
1. LITTLE INTEREST OR PLEASURE IN DOING THINGS: 0
SUM OF ALL RESPONSES TO PHQ QUESTIONS 1-9: 0
4. FEELING TIRED OR HAVING LITTLE ENERGY: 0
1. LITTLE INTEREST OR PLEASURE IN DOING THINGS: 0
2. FEELING DOWN, DEPRESSED OR HOPELESS: 0
7. TROUBLE CONCENTRATING ON THINGS, SUCH AS READING THE NEWSPAPER OR WATCHING TELEVISION: 0
5. POOR APPETITE OR OVEREATING: 0
SUM OF ALL RESPONSES TO PHQ QUESTIONS 1-9: 0
3. TROUBLE FALLING OR STAYING ASLEEP: 0
6. FEELING BAD ABOUT YOURSELF - OR THAT YOU ARE A FAILURE OR HAVE LET YOURSELF OR YOUR FAMILY DOWN: 0
SUM OF ALL RESPONSES TO PHQ QUESTIONS 1-9: 0
9. THOUGHTS THAT YOU WOULD BE BETTER OFF DEAD, OR OF HURTING YOURSELF: 0

## 2023-05-19 NOTE — PROGRESS NOTES
Chronic Disease Follow up    Alonzo Aparicio (: 1980) is a 41 y.o. female here for evaluation of the following chief concerns(s):  Follow-up (1m follow up )       ASSESSMENT/PLAN:  1. Suspected chronic obstructive pulmonary disease based on initial evaluation ([facility]  -     tiotropium (SPIRIVA HANDIHALER) 18 MCG inhalation capsule; Inhale 1 capsule into the lungs daily, Disp-30 capsule, R-2Normal  -     albuterol sulfate HFA (PROVENTIL HFA) 108 (90 Base) MCG/ACT inhaler; Inhale 2 puffs into the lungs every 6 hours as needed for Wheezing, Disp-18 g, R-3Normal  2. Iron deficiency anemia due to chronic blood loss  -     ferrous sulfate (IRON 325) 325 (65 Fe) MG tablet; Take 1 tablet by mouth every other day, Disp-60 tablet, R-2Normal  -     docusate sodium (COLACE) 100 MG capsule; Take 1 capsule by mouth 2 times daily, Disp-60 capsule, R-0Normal  3. Generalized anxiety disorder  4. Heavy alcohol use  5. Severe episode of recurrent major depressive disorder, without psychotic features ([facility]  6. Tobacco use disorder, severe, dependence  7. Essential hypertension  8. Menorrhagia with regular cycle    No orders of the defined types were placed in this encounter. Chronic cough, SOB, wheezing- CT pending that was ordered at last visit- number given to central scheduling. Suspect COPD given her symptoms and smoking history- trial or antimuscarinic inhaler, albuterol as needed- plan to get  PFT's at future visit to confirm. HTN- uncontrolled- patient has not taken meds. Continue Atenolol, Spironolactone. FU 1 month     MICHELLE- restart iron every other day- restart labs, iron itself is okay but ferritin still low. Take with colace for constipation. Encouraged patient to schedule apt with GYN to continue workup. Tobacco abuse- smoking cessation counseling - patient declines any meds- is working on cutting back on her own.  On CT she already is showing diffuse calcific aortic atherosclerosis- we discussed in detail

## 2023-05-19 NOTE — PROGRESS NOTES
Milamoira Mcdonough presents today for   Chief Complaint   Patient presents with    Follow-up     1m follow up        Is someone accompanying this pt? no    Is the patient using any DME equipment during OV? no    Health Maintenance reviewed and discussed and ordered per Provider. Health Maintenance Due   Topic Date Due    COVID-19 Vaccine (1) Never done    Varicella vaccine (1 of 2 - 2-dose childhood series) Never done    Pneumococcal 0-64 years Vaccine (1 - PCV) Never done    DTaP/Tdap/Td vaccine (1 - Tdap) Never done    Breast cancer screen  Never done   . Coordination of Care:  1. \"Have you been to the ER, urgent care clinic since your last visit? Hospitalized since your last visit? \" No    2. \"Have you seen or consulted any other health care providers outside of the [address] [place] [address] since your last visit? \" No    3. For patients aged 39-70: Has the patient had a colonoscopy? N/A based on age/sex    If the patient is female:    4. For patients aged 41-77: Has the patient had a mammogram within the past 2 years? No    5. For patients aged 21-65: Has the patient had a pap smear?  Yes- No care gap present

## 2023-05-24 ENCOUNTER — TELEPHONE (OUTPATIENT)
Facility: CLINIC | Age: 43
End: 2023-05-24

## 2023-05-24 DIAGNOSIS — R05.3 CHRONIC COUGH: Primary | ICD-10-CM

## 2023-05-24 NOTE — TELEPHONE ENCOUNTER
Elba Gan from CT called, she needs another order to be placed for the patient. She asked for the order to say CT of chest with contrast. They don't do both with and without.

## 2023-06-08 DIAGNOSIS — D50.0 IRON DEFICIENCY ANEMIA DUE TO CHRONIC BLOOD LOSS: ICD-10-CM

## 2023-06-09 RX ORDER — DOCUSATE SODIUM 100 MG/1
CAPSULE, LIQUID FILLED ORAL
Qty: 60 CAPSULE | Refills: 0 | Status: SHIPPED | OUTPATIENT
Start: 2023-06-09

## 2023-06-15 ENCOUNTER — OFFICE VISIT (OUTPATIENT)
Facility: CLINIC | Age: 43
End: 2023-06-15
Payer: MEDICAID

## 2023-06-15 VITALS
RESPIRATION RATE: 18 BRPM | TEMPERATURE: 98.4 F | OXYGEN SATURATION: 100 % | SYSTOLIC BLOOD PRESSURE: 158 MMHG | HEART RATE: 91 BPM | DIASTOLIC BLOOD PRESSURE: 97 MMHG | BODY MASS INDEX: 19.73 KG/M2 | HEIGHT: 58 IN | WEIGHT: 94 LBS

## 2023-06-15 DIAGNOSIS — R05.1 ACUTE COUGH: Primary | ICD-10-CM

## 2023-06-15 DIAGNOSIS — R53.83 MALAISE AND FATIGUE: ICD-10-CM

## 2023-06-15 DIAGNOSIS — I10 ESSENTIAL HYPERTENSION: ICD-10-CM

## 2023-06-15 DIAGNOSIS — R50.9 LOW GRADE FEVER: ICD-10-CM

## 2023-06-15 DIAGNOSIS — R09.89 CHEST CONGESTION: ICD-10-CM

## 2023-06-15 DIAGNOSIS — R53.81 MALAISE AND FATIGUE: ICD-10-CM

## 2023-06-15 PROCEDURE — 3078F DIAST BP <80 MM HG: CPT | Performed by: NURSE PRACTITIONER

## 2023-06-15 PROCEDURE — 3074F SYST BP LT 130 MM HG: CPT | Performed by: NURSE PRACTITIONER

## 2023-06-15 PROCEDURE — 99214 OFFICE O/P EST MOD 30 MIN: CPT | Performed by: NURSE PRACTITIONER

## 2023-06-15 RX ORDER — GUAIFENESIN AND DEXTROMETHORPHAN HYDROBROMIDE 1200; 60 MG/1; MG/1
1 TABLET, EXTENDED RELEASE ORAL 2 TIMES DAILY
Qty: 20 TABLET | Refills: 0 | Status: SHIPPED | OUTPATIENT
Start: 2023-06-15

## 2023-06-15 RX ORDER — AZITHROMYCIN 250 MG/1
250 TABLET, FILM COATED ORAL SEE ADMIN INSTRUCTIONS
Qty: 6 TABLET | Refills: 0 | Status: SHIPPED | OUTPATIENT
Start: 2023-06-15 | End: 2023-06-20

## 2023-07-05 RX ORDER — OMEPRAZOLE 20 MG/1
CAPSULE, DELAYED RELEASE ORAL
Qty: 90 CAPSULE | Refills: 1 | Status: SHIPPED | OUTPATIENT
Start: 2023-07-05

## 2023-07-19 ENCOUNTER — OFFICE VISIT (OUTPATIENT)
Facility: CLINIC | Age: 43
End: 2023-07-19
Payer: MEDICAID

## 2023-07-19 VITALS
TEMPERATURE: 97.1 F | RESPIRATION RATE: 19 BRPM | SYSTOLIC BLOOD PRESSURE: 125 MMHG | BODY MASS INDEX: 19.52 KG/M2 | DIASTOLIC BLOOD PRESSURE: 86 MMHG | HEIGHT: 58 IN | OXYGEN SATURATION: 98 % | HEART RATE: 89 BPM | WEIGHT: 93 LBS

## 2023-07-19 DIAGNOSIS — M25.522 BILATERAL ELBOW JOINT PAIN: Primary | ICD-10-CM

## 2023-07-19 DIAGNOSIS — M25.521 BILATERAL ELBOW JOINT PAIN: Primary | ICD-10-CM

## 2023-07-19 PROCEDURE — 99213 OFFICE O/P EST LOW 20 MIN: CPT | Performed by: STUDENT IN AN ORGANIZED HEALTH CARE EDUCATION/TRAINING PROGRAM

## 2023-07-19 PROCEDURE — 3074F SYST BP LT 130 MM HG: CPT | Performed by: STUDENT IN AN ORGANIZED HEALTH CARE EDUCATION/TRAINING PROGRAM

## 2023-07-19 PROCEDURE — 3079F DIAST BP 80-89 MM HG: CPT | Performed by: STUDENT IN AN ORGANIZED HEALTH CARE EDUCATION/TRAINING PROGRAM

## 2023-07-19 NOTE — PROGRESS NOTES
Kizzy Tim presents today for   Chief Complaint   Patient presents with    Follow-up     2m follow up        Is someone accompanying this pt? no    Is the patient using any DME equipment during OV? no    Health Maintenance reviewed and discussed and ordered per Provider. Health Maintenance Due   Topic Date Due    COVID-19 Vaccine (1) Never done    Varicella vaccine (1 of 2 - 2-dose childhood series) Never done    Pneumococcal 0-64 years Vaccine (1 - PCV) Never done    DTaP/Tdap/Td vaccine (1 - Tdap) Never done    Breast cancer screen  Never done   . Coordination of Care:  1. \"Have you been to the ER, urgent care clinic since your last visit? Hospitalized since your last visit? \" No    2. \"Have you seen or consulted any other health care providers outside of the [address] [place] [address] since your last visit? \" No    3. For patients aged 43-73: Has the patient had a colonoscopy? N/A based on age/sex    If the patient is female:    4. For patients aged 43-66: Has the patient had a mammogram within the past 2 years? No    5. For patients aged 21-65: Has the patient had a pap smear?  Yes- No care gap present

## 2023-10-20 ENCOUNTER — HOSPITAL ENCOUNTER (OUTPATIENT)
Age: 43
Discharge: HOME | End: 2023-10-23

## 2023-10-20 ENCOUNTER — OFFICE VISIT (OUTPATIENT)
Facility: CLINIC | Age: 43
End: 2023-10-20
Payer: MEDICAID

## 2023-10-20 VITALS
OXYGEN SATURATION: 100 % | HEIGHT: 58 IN | BODY MASS INDEX: 19.73 KG/M2 | SYSTOLIC BLOOD PRESSURE: 139 MMHG | DIASTOLIC BLOOD PRESSURE: 82 MMHG | RESPIRATION RATE: 18 BRPM | WEIGHT: 94 LBS | HEART RATE: 95 BPM | TEMPERATURE: 97.4 F

## 2023-10-20 DIAGNOSIS — F41.1 GENERALIZED ANXIETY DISORDER: ICD-10-CM

## 2023-10-20 DIAGNOSIS — N92.0 MENORRHAGIA WITH REGULAR CYCLE: ICD-10-CM

## 2023-10-20 DIAGNOSIS — R53.83 OTHER FATIGUE: Primary | ICD-10-CM

## 2023-10-20 DIAGNOSIS — D50.0 IRON DEFICIENCY ANEMIA DUE TO CHRONIC BLOOD LOSS: ICD-10-CM

## 2023-10-20 DIAGNOSIS — F17.200 TOBACCO USE DISORDER, SEVERE, DEPENDENCE: ICD-10-CM

## 2023-10-20 DIAGNOSIS — F10.90 HEAVY ALCOHOL USE: ICD-10-CM

## 2023-10-20 DIAGNOSIS — I10 ESSENTIAL HYPERTENSION: ICD-10-CM

## 2023-10-20 DIAGNOSIS — Z12.31 ENCOUNTER FOR SCREENING MAMMOGRAM FOR MALIGNANT NEOPLASM OF BREAST: ICD-10-CM

## 2023-10-20 DIAGNOSIS — F33.2 SEVERE EPISODE OF RECURRENT MAJOR DEPRESSIVE DISORDER, WITHOUT PSYCHOTIC FEATURES (HCC): ICD-10-CM

## 2023-10-20 DIAGNOSIS — J44.9 SUSPECTED CHRONIC OBSTRUCTIVE PULMONARY DISEASE BASED ON INITIAL EVALUATION (HCC): ICD-10-CM

## 2023-10-20 LAB — LABCORP DRAW FEE: NORMAL

## 2023-10-20 PROCEDURE — 3075F SYST BP GE 130 - 139MM HG: CPT | Performed by: STUDENT IN AN ORGANIZED HEALTH CARE EDUCATION/TRAINING PROGRAM

## 2023-10-20 PROCEDURE — 99214 OFFICE O/P EST MOD 30 MIN: CPT | Performed by: STUDENT IN AN ORGANIZED HEALTH CARE EDUCATION/TRAINING PROGRAM

## 2023-10-20 PROCEDURE — 3079F DIAST BP 80-89 MM HG: CPT | Performed by: STUDENT IN AN ORGANIZED HEALTH CARE EDUCATION/TRAINING PROGRAM

## 2023-10-20 RX ORDER — TIOTROPIUM BROMIDE 18 UG/1
18 CAPSULE ORAL; RESPIRATORY (INHALATION) DAILY
Qty: 30 CAPSULE | Refills: 2 | Status: SHIPPED | OUTPATIENT
Start: 2023-10-20

## 2023-10-20 RX ORDER — IBUPROFEN 800 MG/1
800 TABLET ORAL 2 TIMES DAILY PRN
Qty: 60 TABLET | Refills: 1 | Status: SHIPPED | OUTPATIENT
Start: 2023-10-20

## 2023-10-20 NOTE — PROGRESS NOTES
[employer] presents today for   Chief Complaint   Patient presents with    Follow-up     3m follow up        Is someone accompanying this pt? no    Is the patient using any DME equipment during OV? no    Health Maintenance reviewed and discussed and ordered per Provider. Health Maintenance Due   Topic Date Due    Hepatitis B vaccine (1 of 3 - 3-dose series) Never done    COVID-19 Vaccine (1) Never done    Varicella vaccine (1 of 2 - 2-dose childhood series) Never done    Pneumococcal 0-64 years Vaccine (1 - PCV) Never done    DTaP/Tdap/Td vaccine (1 - Tdap) Never done    Breast cancer screen  Never done    Flu vaccine (1) Never done   . Coordination of Care:  1. \"Have you been to the ER, urgent care clinic since your last visit? Hospitalized since your last visit? \" No    2. \"Have you seen or consulted any other health care providers outside of the [address] [place] [address] since your last visit? \" No    3. For patients aged 43-73: Has the patient had a colonoscopy? N/A based on age/sex    If the patient is female:    4. For patients aged 43-66: Has the patient had a mammogram within the past 2 years? No    5. For patients aged 21-65: Has the patient had a pap smear?  Yes- No care gap present

## 2023-10-20 NOTE — PROGRESS NOTES
Chronic Disease Follow up    Audelia Barr (: 1980) is a 41 y.o. female here for evaluation of the following chief concerns(s):  Follow-up (3m follow up )       ASSESSMENT/PLAN:  1. Other fatigue  -     CBC with Auto Differential; Future  -     Comprehensive Metabolic Panel; Future  -     Thyroid Cascade Profile; Future  -     HIV 1/2 Ag/Ab, 4TH Generation,W Rflx Confirm; Future  -     Urinalysis with Microscopic; Future  -     Vitamin D 25 Hydroxy; Future  -     tiotropium (SPIRIVA HANDIHALER) 18 MCG inhalation capsule; Inhale 1 capsule into the lungs daily, Disp-30 capsule, R-2Normal  -     Iron and TIBC; Future  -     Ferritin; Future  2. Suspected chronic obstructive pulmonary disease based on initial evaluation ([address])  -     tiotropium (SPIRIVA HANDIHALER) 18 MCG inhalation capsule; Inhale 1 capsule into the lungs daily, Disp-30 capsule, R-2Normal  3. Encounter for screening mammogram for malignant neoplasm of breast  -     MARION DIGITAL SCREEN W OR WO CAD BILATERAL;  Future    Orders Placed This Encounter   Procedures    MARION DIGITAL SCREEN W OR WO CAD BILATERAL     Standing Status:   Future     Standing Expiration Date:   2024    CBC with Auto Differential     Standing Status:   Future     Standing Expiration Date:   10/20/2024    Comprehensive Metabolic Panel     Standing Status:   Future     Standing Expiration Date:   10/20/2024    Thyroid Cascade Profile     Standing Status:   Future     Standing Expiration Date:   10/20/2024    HIV 1/2 Ag/Ab, 4TH Generation,W Rflx Confirm     Standing Status:   Future     Standing Expiration Date:   10/20/2024    Urinalysis with Microscopic     Standing Status:   Future     Standing Expiration Date:   10/20/2024    Vitamin D 25 Hydroxy     Standing Status:   Future     Standing Expiration Date:   10/20/2024    Iron and TIBC     Standing Status:   Future     Standing Expiration Date:   10/20/2024    Ferritin     Standing Status:   Future     Standing Expiration

## 2023-10-21 RX ORDER — ERGOCALCIFEROL 1.25 MG/1
50000 CAPSULE ORAL WEEKLY
Qty: 12 CAPSULE | Refills: 1 | Status: SHIPPED | OUTPATIENT
Start: 2023-10-21

## 2023-11-20 ENCOUNTER — OFFICE VISIT (OUTPATIENT)
Age: 43
End: 2023-11-20

## 2023-11-20 VITALS — BODY MASS INDEX: 17.14 KG/M2 | WEIGHT: 85 LBS | HEIGHT: 59 IN

## 2023-11-20 DIAGNOSIS — M77.01 GOLFER'S ELBOW, RIGHT: ICD-10-CM

## 2023-11-20 DIAGNOSIS — M25.522 PAIN OF BOTH ELBOWS: Primary | ICD-10-CM

## 2023-11-20 DIAGNOSIS — M77.02 GOLFER'S ELBOW, LEFT: ICD-10-CM

## 2023-11-20 DIAGNOSIS — M25.521 PAIN OF BOTH ELBOWS: Primary | ICD-10-CM

## 2023-11-20 RX ORDER — TRIAMCINOLONE ACETONIDE 40 MG/ML
40 INJECTION, SUSPENSION INTRA-ARTICULAR; INTRAMUSCULAR ONCE
Status: COMPLETED | OUTPATIENT
Start: 2023-11-20 | End: 2023-11-20

## 2023-11-20 RX ORDER — LIDOCAINE HYDROCHLORIDE 10 MG/ML
1 INJECTION, SOLUTION INFILTRATION; PERINEURAL ONCE
Status: COMPLETED | OUTPATIENT
Start: 2023-11-20 | End: 2023-11-20

## 2023-11-20 RX ADMIN — LIDOCAINE HYDROCHLORIDE 1 ML: 10 INJECTION, SOLUTION INFILTRATION; PERINEURAL at 15:37

## 2023-11-20 RX ADMIN — TRIAMCINOLONE ACETONIDE 40 MG: 40 INJECTION, SUSPENSION INTRA-ARTICULAR; INTRAMUSCULAR at 15:36

## 2023-11-20 RX ADMIN — LIDOCAINE HYDROCHLORIDE 1 ML: 10 INJECTION, SOLUTION INFILTRATION; PERINEURAL at 15:36

## 2023-11-20 NOTE — PATIENT INSTRUCTIONS
Elbow Sprain: Care Instructions  Your Care Instructions     An elbow sprain occurs when you overstretch or tear the ligaments around your elbow. Ligaments are the tough tissues that connect one bone to another. A sprain can happen when you fall or when you play sports or do chores around the house. Most sprains will heal with some treatment at home. Follow-up care is a key part of your treatment and safety. Be sure to make and go to all appointments, and call your doctor if you are having problems. It's also a good idea to know your test results and keep a list of the medicines you take. How can you care for yourself at home? Follow your doctor's directions for wearing a splint, elbow pad, sling, or elastic bandage. Wrapping the elbow may help reduce or prevent swelling. Rest and protect your elbow. Do not do any activity that hurts your elbow. Apply ice or a cold pack to your elbow for 10 to 20 minutes at a time to reduce swelling. Try this every 1 to 2 hours for 3 days (when you are awake) or until the swelling goes down. Put a thin cloth between the ice and your skin. After 2 or 3 days, if your swelling is gone, apply a heating pad on low or a warm cloth to your elbow. This helps keep your arm flexible. Some doctors suggest that you go back and forth between hot and cold. Keep the splint dry. Prop up your elbow on pillows while you apply ice or anytime you sit or lie down. Try to keep the elbow at or above the level of your heart to help reduce swelling. Take pain medicines exactly as directed. If the doctor gave you a prescription medicine for pain, take it as prescribed. If you are not taking a prescription pain medicine, ask your doctor if you can take an over-the-counter medicine. Return to your usual level of activity slowly. When should you call for help? Call your doctor now or seek immediate medical care if:    Your pain is worse.      You have new or increased swelling in your elbow or

## 2023-11-20 NOTE — PROGRESS NOTES
mL Lidocaine 1%, 1 mL Kenalog 40 mg was injected under sterile technique. The needle was withdrawn and the puncture site sealed with a Band-Aid. Technique: Under sterile conditions a [employer] ultrasound unit with a variable frequency (7.0-14.0 MHz) linear transducer was used to localize the placement of needle into the bilateral elbow joint. Findings: Successful needle placement for bilateral elbow injection. Final images were taken and saved for permanent record. The patient tolerated the injection well. The patient was instructed to call the office immediately if there is any pain, redness, warmth, fever, or chills. Visit Diagnoses         Codes    Pain of both elbows    -  Primary M25.521, M25.522    Golfer's elbow, left     M77.02    Golfer's elbow, right     M77.01               HPI:  The patient is here with a chief complaint of bilateral elbow pain, throbbing, burning pain. Pain is 7/10. X-rays of the bilateral elbows AP and lateral are unremarkable. Assessment/Plan:  1. Bilateral epicondylitis, medial in nature. I would like to go ahead and put the patient in for cortisone shot for both. We will see the patient back as needed and go from there. As part of continued conservative pain management options the patient was advised to utilize Tylenol or OTC NSAIDS as long as it is not medically contraindicated. Return to Office: Follow-up and Dispositions    Return if symptoms worsen or fail to improve. Scribed by Lori Mckeon LPN as dictated by [facility] YEIMI Ricci MD.  Documentation, performed by, True and Accepted John Ricci MD

## 2024-02-20 RX ORDER — OMEPRAZOLE 20 MG/1
CAPSULE, DELAYED RELEASE ORAL
Qty: 90 CAPSULE | Refills: 0 | Status: SHIPPED | OUTPATIENT
Start: 2024-02-20

## 2024-05-08 ENCOUNTER — HOSPITAL ENCOUNTER (OUTPATIENT)
Age: 44
Discharge: HOME | End: 2024-05-11

## 2024-05-08 ENCOUNTER — OFFICE VISIT (OUTPATIENT)
Facility: CLINIC | Age: 44
End: 2024-05-08
Payer: MEDICAID

## 2024-05-08 VITALS
HEART RATE: 95 BPM | SYSTOLIC BLOOD PRESSURE: 147 MMHG | HEIGHT: 59 IN | RESPIRATION RATE: 18 BRPM | DIASTOLIC BLOOD PRESSURE: 99 MMHG | TEMPERATURE: 98.4 F | WEIGHT: 92.2 LBS | BODY MASS INDEX: 18.59 KG/M2 | OXYGEN SATURATION: 97 %

## 2024-05-08 DIAGNOSIS — I10 ESSENTIAL HYPERTENSION: ICD-10-CM

## 2024-05-08 DIAGNOSIS — N92.0 MENORRHAGIA WITH REGULAR CYCLE: ICD-10-CM

## 2024-05-08 DIAGNOSIS — F41.1 GENERALIZED ANXIETY DISORDER: ICD-10-CM

## 2024-05-08 DIAGNOSIS — D50.0 IRON DEFICIENCY ANEMIA DUE TO CHRONIC BLOOD LOSS: ICD-10-CM

## 2024-05-08 DIAGNOSIS — F17.200 TOBACCO USE DISORDER, SEVERE, DEPENDENCE: Primary | ICD-10-CM

## 2024-05-08 DIAGNOSIS — F17.200 TOBACCO USE DISORDER, SEVERE, DEPENDENCE: ICD-10-CM

## 2024-05-08 DIAGNOSIS — F10.90 HEAVY ALCOHOL USE: ICD-10-CM

## 2024-05-08 DIAGNOSIS — Z12.31 ENCOUNTER FOR SCREENING MAMMOGRAM FOR MALIGNANT NEOPLASM OF BREAST: ICD-10-CM

## 2024-05-08 DIAGNOSIS — M25.522 PAIN OF BOTH ELBOWS: ICD-10-CM

## 2024-05-08 DIAGNOSIS — M25.521 PAIN OF BOTH ELBOWS: ICD-10-CM

## 2024-05-08 LAB — LABCORP DRAW FEE: NORMAL

## 2024-05-08 PROCEDURE — 3077F SYST BP >= 140 MM HG: CPT | Performed by: STUDENT IN AN ORGANIZED HEALTH CARE EDUCATION/TRAINING PROGRAM

## 2024-05-08 PROCEDURE — 99215 OFFICE O/P EST HI 40 MIN: CPT | Performed by: STUDENT IN AN ORGANIZED HEALTH CARE EDUCATION/TRAINING PROGRAM

## 2024-05-08 PROCEDURE — 3080F DIAST BP >= 90 MM HG: CPT | Performed by: STUDENT IN AN ORGANIZED HEALTH CARE EDUCATION/TRAINING PROGRAM

## 2024-05-08 PROCEDURE — 99001 SPECIMEN HANDLING PT-LAB: CPT

## 2024-05-08 RX ORDER — ATENOLOL 50 MG/1
50 TABLET ORAL DAILY
Qty: 90 TABLET | Refills: 1 | Status: SHIPPED | OUTPATIENT
Start: 2024-05-08 | End: 2024-05-09 | Stop reason: SDUPTHER

## 2024-05-08 RX ORDER — NALTREXONE HYDROCHLORIDE 50 MG/1
50 TABLET, FILM COATED ORAL DAILY
Qty: 90 TABLET | Refills: 1 | Status: SHIPPED | OUTPATIENT
Start: 2024-05-08

## 2024-05-08 SDOH — ECONOMIC STABILITY: FOOD INSECURITY: WITHIN THE PAST 12 MONTHS, THE FOOD YOU BOUGHT JUST DIDN'T LAST AND YOU DIDN'T HAVE MONEY TO GET MORE.: PATIENT DECLINED

## 2024-05-08 SDOH — ECONOMIC STABILITY: FOOD INSECURITY: WITHIN THE PAST 12 MONTHS, YOU WORRIED THAT YOUR FOOD WOULD RUN OUT BEFORE YOU GOT MONEY TO BUY MORE.: PATIENT DECLINED

## 2024-05-08 SDOH — ECONOMIC STABILITY: INCOME INSECURITY: HOW HARD IS IT FOR YOU TO PAY FOR THE VERY BASICS LIKE FOOD, HOUSING, MEDICAL CARE, AND HEATING?: PATIENT DECLINED

## 2024-05-08 SDOH — ECONOMIC STABILITY: HOUSING INSECURITY
IN THE LAST 12 MONTHS, WAS THERE A TIME WHEN YOU DID NOT HAVE A STEADY PLACE TO SLEEP OR SLEPT IN A SHELTER (INCLUDING NOW)?: PATIENT DECLINED

## 2024-05-08 ASSESSMENT — PATIENT HEALTH QUESTIONNAIRE - PHQ9: DEPRESSION UNABLE TO ASSESS: PT REFUSES

## 2024-05-08 NOTE — PROGRESS NOTES
Petra Castrejon presents today for   Chief Complaint   Patient presents with    Follow-up     6m follow up        Is someone accompanying this pt? no    Is the patient using any DME equipment during OV? no    Health Maintenance reviewed and discussed and ordered per Provider.    Health Maintenance Due   Topic Date Due    Hepatitis B vaccine (1 of 3 - 3-dose series) Never done    COVID-19 Vaccine (1) Never done    Varicella vaccine (1 of 2 - 2-dose childhood series) Never done    Pneumococcal 0-64 years Vaccine (1 of 2 - PCV) Never done    DTaP/Tdap/Td vaccine (1 - Tdap) Never done    Breast cancer screen  Never done    Depression Monitoring  05/19/2024   .      \"Have you been to the ER, urgent care clinic since your last visit?  Hospitalized since your last visit?\"    NO    “Have you seen or consulted any other health care providers outside of [facility] since your last visit?”    NO       Have you had a mammogram?”   NO    No breast cancer screening on file

## 2024-05-08 NOTE — PROGRESS NOTES
Chronic Disease Follow up    Petra Castrejon (: 1980) is a 44 y.o. female here for evaluation of the following chief concerns(s):  Follow-up (6m follow up )       ASSESSMENT/PLAN:  1. Tobacco use disorder, severe, dependence  -     Iron and TIBC; Future  -     Ferritin; Future  2. Essential hypertension  -     CBC with Auto Differential; Future  -     Comprehensive Metabolic Panel; Future  -     Magnesium; Future  3. Heavy alcohol use  -     naltrexone (DEPADE) 50 MG tablet; Take 1 tablet by mouth daily, Disp-90 tablet, R-1Normal  4. Encounter for screening mammogram for malignant neoplasm of breast  -     MARION DIGITAL SCREEN W OR WO CAD BILATERAL; Future  5. Pain of both elbows  -     [facility] - Vidal Ford DO, Hand Surgery, [place] ([facility])  6. Generalized anxiety disorder  7. Menorrhagia with regular cycle  8. Iron deficiency anemia due to chronic blood loss    Orders Placed This Encounter   Procedures    MARION DIGITAL SCREEN W OR WO CAD BILATERAL     Standing Status:   Future     Standing Expiration Date:   2025    CBC with Auto Differential     Standing Status:   Future     Standing Expiration Date:   2025    Comprehensive Metabolic Panel     Standing Status:   Future     Standing Expiration Date:   2025    Magnesium     Standing Status:   Future     Standing Expiration Date:   2025    Iron and TIBC     Standing Status:   Future     Standing Expiration Date:   2025    Ferritin     Standing Status:   Future     Standing Expiration Date:   2025    [facility] - Vidal Ford DO, Hand Surgery, [place] ([facility])     Referral Priority:   Routine     Referral Type:   Eval and Treat     Referral Reason:   Specialty Services Required     Referred to Provider:   Vidal Ford DO     Requested Specialty:   Hand Surgery     Number of Visits Requested:   1     HTN- uncontrolled- Continue Spironolactone, increase Atenolol to 100mg daily.     MICHELLE- recheck labs. Get back in with GYN

## 2024-05-09 ENCOUNTER — TELEPHONE (OUTPATIENT)
Facility: CLINIC | Age: 44
End: 2024-05-09

## 2024-05-09 LAB
ALBUMIN SERPL-MCNC: 4.5 G/DL (ref 3.9–4.9)
ALBUMIN/GLOB SERPL: 2 {RATIO} (ref 1.2–2.2)
ALP SERPL-CCNC: 77 IU/L (ref 44–121)
ALT SERPL-CCNC: 10 IU/L (ref 0–32)
AST SERPL-CCNC: 19 IU/L (ref 0–40)
BASOPHILS # BLD AUTO: 0.1 X10E3/UL (ref 0–0.2)
BASOPHILS NFR BLD AUTO: 1 %
BILIRUB SERPL-MCNC: <0.2 MG/DL (ref 0–1.2)
BUN SERPL-MCNC: 14 MG/DL (ref 6–24)
BUN/CREAT SERPL: 20 (ref 9–23)
CALCIUM SERPL-MCNC: 9.2 MG/DL (ref 8.7–10.2)
CHLORIDE SERPL-SCNC: 101 MMOL/L (ref 96–106)
CO2 SERPL-SCNC: 24 MMOL/L (ref 20–29)
CREAT SERPL-MCNC: 0.69 MG/DL (ref 0.57–1)
EGFRCR SERPLBLD CKD-EPI 2021: 111 ML/MIN/1.73
EOSINOPHIL # BLD AUTO: 0.3 X10E3/UL (ref 0–0.4)
EOSINOPHIL NFR BLD AUTO: 3 %
ERYTHROCYTE [DISTWIDTH] IN BLOOD BY AUTOMATED COUNT: 12.8 % (ref 11.7–15.4)
FERRITIN SERPL-MCNC: 18 NG/ML (ref 15–150)
GLOBULIN SER CALC-MCNC: 2.3 G/DL (ref 1.5–4.5)
GLUCOSE SERPL-MCNC: 81 MG/DL (ref 70–99)
HCT VFR BLD AUTO: 38.2 % (ref 34–46.6)
HGB BLD-MCNC: 12.9 G/DL (ref 11.1–15.9)
IMM GRANULOCYTES # BLD AUTO: 0 X10E3/UL (ref 0–0.1)
IMM GRANULOCYTES NFR BLD AUTO: 0 %
IRON SATN MFR SERPL: 9 % (ref 15–55)
IRON SERPL-MCNC: 39 UG/DL (ref 27–159)
LYMPHOCYTES # BLD AUTO: 2.8 X10E3/UL (ref 0.7–3.1)
LYMPHOCYTES NFR BLD AUTO: 30 %
MAGNESIUM SERPL-MCNC: 2.1 MG/DL (ref 1.6–2.3)
MCH RBC QN AUTO: 32.7 PG (ref 26.6–33)
MCHC RBC AUTO-ENTMCNC: 33.8 G/DL (ref 31.5–35.7)
MCV RBC AUTO: 97 FL (ref 79–97)
MONOCYTES # BLD AUTO: 0.8 X10E3/UL (ref 0.1–0.9)
MONOCYTES NFR BLD AUTO: 9 %
NEUTROPHILS # BLD AUTO: 5.3 X10E3/UL (ref 1.4–7)
NEUTROPHILS NFR BLD AUTO: 57 %
PLATELET # BLD AUTO: 333 X10E3/UL (ref 150–450)
POTASSIUM SERPL-SCNC: 4 MMOL/L (ref 3.5–5.2)
PROT SERPL-MCNC: 6.8 G/DL (ref 6–8.5)
RBC # BLD AUTO: 3.94 X10E6/UL (ref 3.77–5.28)
SODIUM SERPL-SCNC: 138 MMOL/L (ref 134–144)
SPECIMEN STATUS REPORT: NORMAL
TIBC SERPL-MCNC: 416 UG/DL (ref 250–450)
UIBC SERPL-MCNC: 377 UG/DL (ref 131–425)
WBC # BLD AUTO: 9.4 X10E3/UL (ref 3.4–10.8)

## 2024-05-09 RX ORDER — ATENOLOL 100 MG/1
50 TABLET ORAL DAILY
Qty: 90 TABLET | Refills: 1 | Status: SHIPPED | OUTPATIENT
Start: 2024-05-09 | End: 2024-05-09

## 2024-05-09 RX ORDER — ATENOLOL 100 MG/1
100 TABLET ORAL DAILY
Qty: 90 TABLET | Refills: 1 | Status: SHIPPED | OUTPATIENT
Start: 2024-05-09

## 2024-05-09 NOTE — TELEPHONE ENCOUNTER
----- Message from Radha Sandhu sent at 5/9/2024 10:44 AM EDT -----  Subject: Medication Problem     Medication: atenolol (TENORMIN) 50 MG tablet  Dosage: 50mg  Ordering Provider:     Question/Problem: PT is questioning the dosage amount. Her check out   papers say her Rx is 100mg, but the prescription she got at the pharmacy   is only 50 mg.      Pharmacy: [address] - P   [phone] - F [phone]    ---------------------------------------------------------------------------  --------------  CALL BACK INFO  [phone]; OK to leave message on voicemail  ---------------------------------------------------------------------------  --------------    SCRIPT ANSWERS  Relationship to Patient: Self

## 2024-05-14 ENCOUNTER — TELEPHONE (OUTPATIENT)
Facility: CLINIC | Age: 44
End: 2024-05-14

## 2024-05-14 RX ORDER — ATENOLOL 100 MG/1
100 TABLET ORAL DAILY
Qty: 90 TABLET | Refills: 1 | Status: SHIPPED | OUTPATIENT
Start: 2024-05-14

## 2024-05-14 NOTE — TELEPHONE ENCOUNTER
Patient is confused because her BP medication was up to 100 but instructions still say to cut medication in half and she is unsure what today.     Call back number is [phone]

## 2024-05-29 ENCOUNTER — HOSPITAL ENCOUNTER (OUTPATIENT)
Age: 44
Discharge: HOME | End: 2024-06-01
Attending: STUDENT IN AN ORGANIZED HEALTH CARE EDUCATION/TRAINING PROGRAM
Payer: MEDICAID

## 2024-05-29 VITALS — BODY MASS INDEX: 18.55 KG/M2 | WEIGHT: 92 LBS | HEIGHT: 59 IN

## 2024-05-29 DIAGNOSIS — Z12.31 ENCOUNTER FOR SCREENING MAMMOGRAM FOR MALIGNANT NEOPLASM OF BREAST: ICD-10-CM

## 2024-05-29 PROCEDURE — 77063 BREAST TOMOSYNTHESIS BI: CPT

## 2024-06-08 DIAGNOSIS — J44.9 SUSPECTED CHRONIC OBSTRUCTIVE PULMONARY DISEASE BASED ON INITIAL EVALUATION (HCC): ICD-10-CM

## 2024-06-08 DIAGNOSIS — D50.0 IRON DEFICIENCY ANEMIA DUE TO CHRONIC BLOOD LOSS: ICD-10-CM

## 2024-06-10 RX ORDER — OMEPRAZOLE 20 MG/1
CAPSULE, DELAYED RELEASE ORAL
Qty: 90 CAPSULE | Refills: 0 | Status: SHIPPED | OUTPATIENT
Start: 2024-06-10

## 2024-06-10 RX ORDER — ALBUTEROL SULFATE 90 UG/1
2 AEROSOL, METERED RESPIRATORY (INHALATION) EVERY 6 HOURS PRN
Qty: 9 G | Refills: 0 | Status: SHIPPED | OUTPATIENT
Start: 2024-06-10

## 2024-06-10 RX ORDER — FERROUS SULFATE 325(65) MG
1 TABLET ORAL EVERY OTHER DAY
Qty: 15 TABLET | Refills: 0 | Status: SHIPPED | OUTPATIENT
Start: 2024-06-10

## 2024-06-10 RX ORDER — SPIRONOLACTONE 25 MG/1
25 TABLET ORAL DAILY
Qty: 90 TABLET | Refills: 0 | Status: SHIPPED | OUTPATIENT
Start: 2024-06-10

## 2024-06-12 ENCOUNTER — OFFICE VISIT (OUTPATIENT)
Facility: CLINIC | Age: 44
End: 2024-06-12
Payer: MEDICAID

## 2024-06-12 VITALS
OXYGEN SATURATION: 97 % | WEIGHT: 90.2 LBS | HEIGHT: 59 IN | DIASTOLIC BLOOD PRESSURE: 93 MMHG | SYSTOLIC BLOOD PRESSURE: 153 MMHG | RESPIRATION RATE: 18 BRPM | HEART RATE: 80 BPM | BODY MASS INDEX: 18.18 KG/M2 | TEMPERATURE: 98.7 F

## 2024-06-12 DIAGNOSIS — D50.0 IRON DEFICIENCY ANEMIA DUE TO CHRONIC BLOOD LOSS: ICD-10-CM

## 2024-06-12 DIAGNOSIS — I10 ESSENTIAL HYPERTENSION: Primary | ICD-10-CM

## 2024-06-12 DIAGNOSIS — F10.90 HEAVY ALCOHOL USE: ICD-10-CM

## 2024-06-12 PROCEDURE — 3077F SYST BP >= 140 MM HG: CPT | Performed by: STUDENT IN AN ORGANIZED HEALTH CARE EDUCATION/TRAINING PROGRAM

## 2024-06-12 PROCEDURE — 3080F DIAST BP >= 90 MM HG: CPT | Performed by: STUDENT IN AN ORGANIZED HEALTH CARE EDUCATION/TRAINING PROGRAM

## 2024-06-12 PROCEDURE — 99214 OFFICE O/P EST MOD 30 MIN: CPT | Performed by: STUDENT IN AN ORGANIZED HEALTH CARE EDUCATION/TRAINING PROGRAM

## 2024-06-12 RX ORDER — NALTREXONE HYDROCHLORIDE 50 MG/1
100 TABLET, FILM COATED ORAL DAILY
Qty: 90 TABLET | Refills: 1 | Status: SHIPPED | OUTPATIENT
Start: 2024-06-12

## 2024-06-12 RX ORDER — CLONIDINE HYDROCHLORIDE 0.1 MG/1
0.1 TABLET ORAL 2 TIMES DAILY
Qty: 60 TABLET | Refills: 3 | Status: SHIPPED | OUTPATIENT
Start: 2024-06-12

## 2024-06-12 RX ORDER — IBUPROFEN 800 MG/1
800 TABLET ORAL 2 TIMES DAILY PRN
Qty: 60 TABLET | Refills: 1 | Status: SHIPPED | OUTPATIENT
Start: 2024-06-12

## 2024-06-12 RX ORDER — ATENOLOL 100 MG/1
100 TABLET ORAL DAILY
Qty: 90 TABLET | Refills: 1 | Status: SHIPPED | OUTPATIENT
Start: 2024-06-12

## 2024-06-12 RX ORDER — ATENOLOL 50 MG/1
TABLET ORAL
COMMUNITY
Start: 2024-06-08 | End: 2024-06-12

## 2024-06-12 ASSESSMENT — PATIENT HEALTH QUESTIONNAIRE - PHQ9: DEPRESSION UNABLE TO ASSESS: PT REFUSES

## 2024-06-12 NOTE — PROGRESS NOTES
Petra Castrejon presents today for   Chief Complaint   Patient presents with    Follow-up     1m follow up        Is someone accompanying this pt? no    Is the patient using any DME equipment during OV? no    Health Maintenance Due   Topic Date Due    Hepatitis B vaccine (1 of 3 - 3-dose series) Never done    COVID-19 Vaccine (1) Never done    Varicella vaccine (1 of 2 - 2-dose childhood series) Never done    Pneumococcal 0-64 years Vaccine (1 of 2 - PCV) Never done    DTaP/Tdap/Td vaccine (1 - Tdap) Never done    Depression Monitoring  05/19/2024       \"Have you been to the ER, urgent care clinic since your last visit?  Hospitalized since your last visit?\"    NO    “Have you seen or consulted any other [facility] providers [facility]?”    NO

## 2024-06-13 NOTE — PROGRESS NOTES
Chronic Disease Follow up    Petra Castrejon (: 1980) is a 44 y.o. female here for evaluation of the following chief concerns(s):  Follow-up (1m follow up )       ASSESSMENT/PLAN:  1. Essential hypertension  2. Iron deficiency anemia due to chronic blood loss  3. Heavy alcohol use  -     naltrexone (DEPADE) 50 MG tablet; Take 2 tablets by mouth daily, Disp-90 tablet, R-1Normal    No orders of the defined types were placed in this encounter.    HTN- uncontrolled,  Continue Spironolactone, Atenolol- add Clonidine, may help some with alcohol withdrawal symptoms and sleep also. Discussed side effect profile with patient.    Heavy alcohol use- has cut back, great work!! Increase Naltrexone to 100mg daily. Clonidine for mild withdrawal symptoms.     MICHELLE- pending GYN consult. Iron improving on last labs, will continue     Tobacco abuse- smoking cessation counseling - patient declines any meds- is working on cutting back on her own. On CT she already is showing diffuse calcific aortic atherosclerosis- we discussed in detail how quitting nicotine could really decrease her cardiovascular risk. Consider discussion about statin at follow up    Anxiety/depression- uncontrolled- patient self medicating with alcohol and marijuana currently- will discuss at follow up.     Bilateral elbow pain- s/p cortisone injections, pending second opinion from ortho.     No follow-ups on file.    Patient agrees with plan as above and has no additional questions at this time.     SUBJECTIVE/OBJECTIVE:    Patient presents for follow up chronic disease.  She has been taking naltrexone and tolerating well.  She has cut back significantly on her alcohol intake.  She is finding that the effects are great throughout the day and then seem to wear off in the evening.  Wondering about increasing the dose.  She does have some insomnia and jitters at night.    HTN   Complications: None  Compliant with current medications, tolerating well.     Iron

## 2024-06-26 ENCOUNTER — OFFICE VISIT (OUTPATIENT)
Age: 44
End: 2024-06-26
Payer: MEDICAID

## 2024-06-26 VITALS — RESPIRATION RATE: 16 BRPM | BODY MASS INDEX: 18.22 KG/M2 | HEIGHT: 59 IN

## 2024-06-26 DIAGNOSIS — M77.02 MEDIAL EPICONDYLITIS OF ELBOW, LEFT: Primary | ICD-10-CM

## 2024-06-26 DIAGNOSIS — M77.01 MEDIAL EPICONDYLITIS OF ELBOW, RIGHT: ICD-10-CM

## 2024-06-26 PROCEDURE — 99204 OFFICE O/P NEW MOD 45 MIN: CPT | Performed by: ORTHOPAEDIC SURGERY

## 2024-06-26 NOTE — PROGRESS NOTES
Lack of Transportation (Medical): Not on file     Lack of Transportation (Non-Medical): Patient declined   Physical Activity: Not on file   Stress: Not on file   Social Connections: Not on file   Intimate Partner Violence: Not on file   Housing Stability: Unknown (5/8/2024)    Housing Stability Vital Sign     Unable to Pay for Housing in the Last Year: Not on file     Number of Places Lived in the Last Year: Not on file     Unstable Housing in the Last Year: Patient declined       REVIEW OF SYSTEMS:    14 point review of systems on the intake form is negative except as noted in the HPI    PHYSICAL EXAMINATION:  Resp 16   Ht 1.499 m (4' 11\")   LMP 05/29/2024 Comment: tubes tied 6-7-2006  BMI 18.22 kg/m²   Body mass index is 18.22 kg/m².    GENERAL: Alert and oriented x3, in no acute distress, well-developed, well-nourished.  HEENT: Normocephalic, atraumatic.    Elbow Exam   R   L  ROM       Flexion   Full   Full   Extension  Full   Full   Pronation  Full   Full   Supination  Full   Full  Tenderness to palpation   Medial Epicondyle +   +   Lateral Epicondyle -   -  Tinel Sign Cubital Tunnel -   -  Ulnar Nerve Subluxation -   -  Distal Biceps Abnormality -   -  Triceps Abnormality  -   -  Other tenderness  -   -  Other Deformity  -   -  Olecranon Bursitis  -   -  Warmth   -   -  Erythema   -   -  Additional Findings: None      IMAGING:  Imaging read by myself and interpreted as follows:  Previous x-rays of the elbows were reviewed which are negative for any fractures.    ASSESSMENT & PLAN  Diagnosis: Bilateral elbow medial epicondylitis    Petra Castrejon has symptomatic bilateral medial epicondylitis.  This is failed conservative management with home exercises and injections.  I ordered MRIs of both elbows.  Follow-up afterwards.    Prescription medication management discussed. In the interim prior to the next visit should symptoms worsen I recommend Tylenol or OTC NSAIDs to be taken as needed as long as these

## 2024-07-17 ENCOUNTER — OFFICE VISIT (OUTPATIENT)
Facility: CLINIC | Age: 44
End: 2024-07-17
Payer: MEDICAID

## 2024-07-17 VITALS
OXYGEN SATURATION: 99 % | HEART RATE: 67 BPM | TEMPERATURE: 98.2 F | WEIGHT: 91.6 LBS | BODY MASS INDEX: 18.47 KG/M2 | HEIGHT: 59 IN | RESPIRATION RATE: 18 BRPM | DIASTOLIC BLOOD PRESSURE: 87 MMHG | SYSTOLIC BLOOD PRESSURE: 127 MMHG

## 2024-07-17 DIAGNOSIS — I25.10 CORONARY ARTERY CALCIFICATION SEEN ON CAT SCAN: ICD-10-CM

## 2024-07-17 DIAGNOSIS — F10.90 HEAVY ALCOHOL USE: ICD-10-CM

## 2024-07-17 DIAGNOSIS — I25.84 CORONARY ARTERY CALCIFICATION: ICD-10-CM

## 2024-07-17 DIAGNOSIS — F41.9 ANXIETY: Primary | ICD-10-CM

## 2024-07-17 DIAGNOSIS — Z71.89 COUNSELING ON HEALTH PROMOTION AND DISEASE PREVENTION: ICD-10-CM

## 2024-07-17 DIAGNOSIS — I25.10 CORONARY ARTERY CALCIFICATION: ICD-10-CM

## 2024-07-17 PROCEDURE — 3079F DIAST BP 80-89 MM HG: CPT | Performed by: STUDENT IN AN ORGANIZED HEALTH CARE EDUCATION/TRAINING PROGRAM

## 2024-07-17 PROCEDURE — 99214 OFFICE O/P EST MOD 30 MIN: CPT | Performed by: STUDENT IN AN ORGANIZED HEALTH CARE EDUCATION/TRAINING PROGRAM

## 2024-07-17 PROCEDURE — 3074F SYST BP LT 130 MM HG: CPT | Performed by: STUDENT IN AN ORGANIZED HEALTH CARE EDUCATION/TRAINING PROGRAM

## 2024-07-17 PROCEDURE — 99401 PREV MED CNSL INDIV APPRX 15: CPT | Performed by: STUDENT IN AN ORGANIZED HEALTH CARE EDUCATION/TRAINING PROGRAM

## 2024-07-17 RX ORDER — GABAPENTIN 300 MG/1
300 CAPSULE ORAL 2 TIMES DAILY
Qty: 60 CAPSULE | Refills: 0 | Status: SHIPPED | OUTPATIENT
Start: 2024-07-17 | End: 2024-08-16

## 2024-07-17 RX ORDER — ONDANSETRON 4 MG/1
4 TABLET, FILM COATED ORAL DAILY PRN
Qty: 30 TABLET | Refills: 1 | Status: SHIPPED | OUTPATIENT
Start: 2024-07-17

## 2024-07-17 ASSESSMENT — PATIENT HEALTH QUESTIONNAIRE - PHQ9
9. THOUGHTS THAT YOU WOULD BE BETTER OFF DEAD, OR OF HURTING YOURSELF: NOT AT ALL
4. FEELING TIRED OR HAVING LITTLE ENERGY: NOT AT ALL
6. FEELING BAD ABOUT YOURSELF - OR THAT YOU ARE A FAILURE OR HAVE LET YOURSELF OR YOUR FAMILY DOWN: NOT AT ALL
SUM OF ALL RESPONSES TO PHQ QUESTIONS 1-9: 3
SUM OF ALL RESPONSES TO PHQ QUESTIONS 1-9: 3
10. IF YOU CHECKED OFF ANY PROBLEMS, HOW DIFFICULT HAVE THESE PROBLEMS MADE IT FOR YOU TO DO YOUR WORK, TAKE CARE OF THINGS AT HOME, OR GET ALONG WITH OTHER PEOPLE: NOT DIFFICULT AT ALL
SUM OF ALL RESPONSES TO PHQ9 QUESTIONS 1 & 2: 0
8. MOVING OR SPEAKING SO SLOWLY THAT OTHER PEOPLE COULD HAVE NOTICED. OR THE OPPOSITE, BEING SO FIGETY OR RESTLESS THAT YOU HAVE BEEN MOVING AROUND A LOT MORE THAN USUAL: NOT AT ALL
5. POOR APPETITE OR OVEREATING: NOT AT ALL
SUM OF ALL RESPONSES TO PHQ QUESTIONS 1-9: 3
1. LITTLE INTEREST OR PLEASURE IN DOING THINGS: NOT AT ALL
3. TROUBLE FALLING OR STAYING ASLEEP: NEARLY EVERY DAY
SUM OF ALL RESPONSES TO PHQ QUESTIONS 1-9: 3
2. FEELING DOWN, DEPRESSED OR HOPELESS: NOT AT ALL
7. TROUBLE CONCENTRATING ON THINGS, SUCH AS READING THE NEWSPAPER OR WATCHING TELEVISION: NOT AT ALL

## 2024-10-06 DIAGNOSIS — J44.9 SUSPECTED CHRONIC OBSTRUCTIVE PULMONARY DISEASE BASED ON INITIAL EVALUATION (HCC): ICD-10-CM

## 2024-10-06 DIAGNOSIS — R53.83 OTHER FATIGUE: ICD-10-CM

## 2024-10-06 DIAGNOSIS — F10.90 HEAVY ALCOHOL USE: ICD-10-CM

## 2024-10-07 RX ORDER — TIOTROPIUM BROMIDE 18 UG/1
CAPSULE ORAL; RESPIRATORY (INHALATION) DAILY
Qty: 90 CAPSULE | Refills: 1 | Status: SHIPPED | OUTPATIENT
Start: 2024-10-07

## 2024-10-07 RX ORDER — ALBUTEROL SULFATE 90 UG/1
2 INHALANT RESPIRATORY (INHALATION) EVERY 6 HOURS PRN
Qty: 9 G | Refills: 5 | Status: SHIPPED | OUTPATIENT
Start: 2024-10-07

## 2024-10-07 RX ORDER — SPIRONOLACTONE 25 MG/1
25 TABLET ORAL DAILY
Qty: 90 TABLET | Refills: 1 | Status: SHIPPED | OUTPATIENT
Start: 2024-10-07

## 2024-10-07 RX ORDER — NALTREXONE HYDROCHLORIDE 50 MG/1
100 TABLET, FILM COATED ORAL DAILY
Qty: 180 TABLET | Refills: 1 | Status: SHIPPED | OUTPATIENT
Start: 2024-10-07

## 2024-12-06 ENCOUNTER — TELEMEDICINE (OUTPATIENT)
Facility: CLINIC | Age: 44
End: 2024-12-06
Payer: MEDICAID

## 2024-12-06 DIAGNOSIS — B97.89 VIRAL RESPIRATORY ILLNESS: Primary | ICD-10-CM

## 2024-12-06 DIAGNOSIS — R05.1 ACUTE COUGH: ICD-10-CM

## 2024-12-06 DIAGNOSIS — J98.8 VIRAL RESPIRATORY ILLNESS: Primary | ICD-10-CM

## 2024-12-06 PROCEDURE — 99213 OFFICE O/P EST LOW 20 MIN: CPT | Performed by: PHYSICIAN ASSISTANT

## 2024-12-06 RX ORDER — DEXTROMETHORPHAN HYDROBROMIDE AND PROMETHAZINE HYDROCHLORIDE 15; 6.25 MG/5ML; MG/5ML
5 SYRUP ORAL EVERY 6 HOURS PRN
Qty: 150 ML | Refills: 0 | Status: SHIPPED | OUTPATIENT
Start: 2024-12-06 | End: 2024-12-14

## 2024-12-06 ASSESSMENT — ENCOUNTER SYMPTOMS
SHORTNESS OF BREATH: 0
COUGH: 1
WHEEZING: 0
RHINORRHEA: 1

## 2024-12-10 ENCOUNTER — OFFICE VISIT (OUTPATIENT)
Facility: CLINIC | Age: 44
End: 2024-12-10
Payer: MEDICAID

## 2024-12-10 VITALS
DIASTOLIC BLOOD PRESSURE: 90 MMHG | WEIGHT: 90.6 LBS | HEART RATE: 96 BPM | OXYGEN SATURATION: 91 % | TEMPERATURE: 98.2 F | HEIGHT: 59 IN | SYSTOLIC BLOOD PRESSURE: 133 MMHG | BODY MASS INDEX: 18.27 KG/M2 | RESPIRATION RATE: 18 BRPM

## 2024-12-10 DIAGNOSIS — F10.90 HEAVY ALCOHOL USE: ICD-10-CM

## 2024-12-10 DIAGNOSIS — J44.1 COPD EXACERBATION (HCC): Primary | ICD-10-CM

## 2024-12-10 PROCEDURE — 3080F DIAST BP >= 90 MM HG: CPT | Performed by: STUDENT IN AN ORGANIZED HEALTH CARE EDUCATION/TRAINING PROGRAM

## 2024-12-10 PROCEDURE — 99214 OFFICE O/P EST MOD 30 MIN: CPT | Performed by: STUDENT IN AN ORGANIZED HEALTH CARE EDUCATION/TRAINING PROGRAM

## 2024-12-10 PROCEDURE — 3075F SYST BP GE 130 - 139MM HG: CPT | Performed by: STUDENT IN AN ORGANIZED HEALTH CARE EDUCATION/TRAINING PROGRAM

## 2024-12-10 RX ORDER — ONDANSETRON 4 MG/1
4 TABLET, FILM COATED ORAL DAILY PRN
Qty: 30 TABLET | Refills: 1 | Status: CANCELLED | OUTPATIENT
Start: 2024-12-10

## 2024-12-10 RX ORDER — ERGOCALCIFEROL 1.25 MG/1
50000 CAPSULE, LIQUID FILLED ORAL WEEKLY
Qty: 12 CAPSULE | Refills: 1 | Status: CANCELLED | OUTPATIENT
Start: 2024-12-10

## 2024-12-10 RX ORDER — ERGOCALCIFEROL 1.25 MG/1
50000 CAPSULE, LIQUID FILLED ORAL WEEKLY
Qty: 12 CAPSULE | Refills: 1 | Status: SHIPPED | OUTPATIENT
Start: 2024-12-10

## 2024-12-10 RX ORDER — ONDANSETRON 4 MG/1
4 TABLET, ORALLY DISINTEGRATING ORAL EVERY 8 HOURS PRN
Qty: 30 TABLET | Refills: 0 | Status: SHIPPED | OUTPATIENT
Start: 2024-12-10

## 2024-12-10 RX ORDER — AZITHROMYCIN 250 MG/1
TABLET, FILM COATED ORAL
Qty: 6 TABLET | Refills: 0 | Status: SHIPPED | OUTPATIENT
Start: 2024-12-10 | End: 2024-12-20

## 2024-12-10 RX ORDER — PREDNISONE 20 MG/1
40 TABLET ORAL DAILY
Qty: 10 TABLET | Refills: 0 | Status: SHIPPED | OUTPATIENT
Start: 2024-12-10 | End: 2024-12-15

## 2024-12-10 RX ORDER — NALTREXONE HYDROCHLORIDE 50 MG/1
100 TABLET, FILM COATED ORAL DAILY
Qty: 180 TABLET | Refills: 1 | Status: SHIPPED | OUTPATIENT
Start: 2024-12-10

## 2024-12-10 NOTE — PROGRESS NOTES
Petra Castrejon presents today for   Chief Complaint   Patient presents with    1 Month Follow-Up       Is someone accompanying this pt? no    Is the patient using any DME equipment during OV? no    Health Maintenance Due   Topic Date Due    Pneumococcal 0-64 years Vaccine (1 of 2 - PCV) Never done    Varicella vaccine (1 of 2 - 13+ 2-dose series) Never done    Hepatitis B vaccine (1 of 3 - 19+ 3-dose series) Never done    DTaP/Tdap/Td vaccine (1 - Tdap) Never done    Flu vaccine (1) Never done    COVID-19 Vaccine (1 - 2023-24 season) Never done         \"Have you been to the ER, urgent care clinic since your last visit?  Hospitalized since your last visit?\"    NO    “Have you seen or consulted any other health care providers outside our system since your last visit?”    NO

## 2024-12-10 NOTE — PROGRESS NOTES
SICK VISIT     Petra Castrejon (: 1980) is a 44 y.o. female here for evaluation of the following chief concerns(s):  1 Month Follow-Up       ASSESSMENT/PLAN:  1. COPD exacerbation (HCC)  2. Heavy alcohol use  -     naltrexone (DEPADE) 50 MG tablet; Take 2 tablets by mouth daily, Disp-180 tablet, R-1Normal    No orders of the defined types were placed in this encounter.    COPD exacerbation-treat with prednisone, azithromycin.  Return/ER precautions    Heavy alcohol use-discussion about cessation again, she wants to restart naltrexone which have prescribed for her.  Will see her back in the new year her regular follow-up.  She did not like the gabapentin that I had prescribed for anxiety and alcohol use-I really do think that her anxiety is contributing to this and we should try to find a good treatment for her.    FU as scheduled for chronic disease    Patient agrees with plan as above and has no additional questions at this time.     SUBJECTIVE/OBJECTIVE:    Patient presents for follow-up and acute illness    She has been sick with a respiratory virus for the last 10 days.  Cough is extremely bothersome.  All day and all night.  Gets short of breath and her coughing fits.  She does have COPD and is a current smoker.  She is not having any fevers or chills.  No major congestion.    She had been abstaining from alcohol for quite some time, fell off the wagon a couple months ago.  Wants to get back on-felt great when she was abstaining from alcohol.  Naltrexone really helped her and she wants to restart.    Vitals:    12/10/24 1603   BP: (!) 133/90   Site: Right Upper Arm   Position: Sitting   Cuff Size: Medium Adult   Pulse: 96   Resp: 18   Temp: 98.2 °F (36.8 °C)   TempSrc: Temporal   SpO2: 91%   Weight: 41.1 kg (90 lb 9.6 oz)   Height: 1.499 m (4' 11\")      Body mass index is 18.3 kg/m².     Gen: NAD, well appearing   Heart: RRR, no m/g/r  Lungs: Diffuse expiratory wheezing throughout  Psych: cooperative.

## 2025-01-14 ENCOUNTER — OFFICE VISIT (OUTPATIENT)
Facility: CLINIC | Age: 45
End: 2025-01-14
Payer: MEDICAID

## 2025-01-14 VITALS
OXYGEN SATURATION: 94 % | WEIGHT: 90.4 LBS | RESPIRATION RATE: 18 BRPM | BODY MASS INDEX: 18.22 KG/M2 | TEMPERATURE: 98.1 F | DIASTOLIC BLOOD PRESSURE: 74 MMHG | SYSTOLIC BLOOD PRESSURE: 121 MMHG | HEART RATE: 82 BPM | HEIGHT: 59 IN

## 2025-01-14 DIAGNOSIS — F10.139 ALCOHOL ABUSE WITH WITHDRAWAL (HCC): Primary | ICD-10-CM

## 2025-01-14 PROCEDURE — 99214 OFFICE O/P EST MOD 30 MIN: CPT | Performed by: STUDENT IN AN ORGANIZED HEALTH CARE EDUCATION/TRAINING PROGRAM

## 2025-01-14 PROCEDURE — 3074F SYST BP LT 130 MM HG: CPT | Performed by: STUDENT IN AN ORGANIZED HEALTH CARE EDUCATION/TRAINING PROGRAM

## 2025-01-14 PROCEDURE — 3078F DIAST BP <80 MM HG: CPT | Performed by: STUDENT IN AN ORGANIZED HEALTH CARE EDUCATION/TRAINING PROGRAM

## 2025-01-14 RX ORDER — DIAZEPAM 10 MG/1
TABLET ORAL
Qty: 10 TABLET | Refills: 0 | Status: SHIPPED | OUTPATIENT
Start: 2025-01-14 | End: 2025-01-16 | Stop reason: SDUPTHER

## 2025-01-14 RX ORDER — IBUPROFEN 800 MG/1
800 TABLET, FILM COATED ORAL 2 TIMES DAILY PRN
Qty: 60 TABLET | Refills: 1 | Status: SHIPPED | OUTPATIENT
Start: 2025-01-14

## 2025-01-14 SDOH — ECONOMIC STABILITY: FOOD INSECURITY: WITHIN THE PAST 12 MONTHS, YOU WORRIED THAT YOUR FOOD WOULD RUN OUT BEFORE YOU GOT MONEY TO BUY MORE.: PATIENT DECLINED

## 2025-01-14 SDOH — ECONOMIC STABILITY: TRANSPORTATION INSECURITY
IN THE PAST 12 MONTHS, HAS LACK OF TRANSPORTATION KEPT YOU FROM MEETINGS, WORK, OR FROM GETTING THINGS NEEDED FOR DAILY LIVING?: NO

## 2025-01-14 SDOH — ECONOMIC STABILITY: FOOD INSECURITY: WITHIN THE PAST 12 MONTHS, THE FOOD YOU BOUGHT JUST DIDN'T LAST AND YOU DIDN'T HAVE MONEY TO GET MORE.: PATIENT DECLINED

## 2025-01-14 SDOH — ECONOMIC STABILITY: INCOME INSECURITY: IN THE LAST 12 MONTHS, WAS THERE A TIME WHEN YOU WERE NOT ABLE TO PAY THE MORTGAGE OR RENT ON TIME?: NO

## 2025-01-14 SDOH — ECONOMIC STABILITY: TRANSPORTATION INSECURITY
IN THE PAST 12 MONTHS, HAS THE LACK OF TRANSPORTATION KEPT YOU FROM MEDICAL APPOINTMENTS OR FROM GETTING MEDICATIONS?: NO

## 2025-01-14 ASSESSMENT — PATIENT HEALTH QUESTIONNAIRE - PHQ9
SUM OF ALL RESPONSES TO PHQ QUESTIONS 1-9: 6
10. IF YOU CHECKED OFF ANY PROBLEMS, HOW DIFFICULT HAVE THESE PROBLEMS MADE IT FOR YOU TO DO YOUR WORK, TAKE CARE OF THINGS AT HOME, OR GET ALONG WITH OTHER PEOPLE: NOT DIFFICULT AT ALL
8. MOVING OR SPEAKING SO SLOWLY THAT OTHER PEOPLE COULD HAVE NOTICED. OR THE OPPOSITE, BEING SO FIGETY OR RESTLESS THAT YOU HAVE BEEN MOVING AROUND A LOT MORE THAN USUAL: NOT AT ALL
2. FEELING DOWN, DEPRESSED OR HOPELESS: NOT AT ALL
SUM OF ALL RESPONSES TO PHQ QUESTIONS 1-9: 6
4. FEELING TIRED OR HAVING LITTLE ENERGY: SEVERAL DAYS
7. TROUBLE CONCENTRATING ON THINGS, SUCH AS READING THE NEWSPAPER OR WATCHING TELEVISION: SEVERAL DAYS
6. FEELING BAD ABOUT YOURSELF - OR THAT YOU ARE A FAILURE OR HAVE LET YOURSELF OR YOUR FAMILY DOWN: NOT AT ALL
SUM OF ALL RESPONSES TO PHQ9 QUESTIONS 1 & 2: 1
9. THOUGHTS THAT YOU WOULD BE BETTER OFF DEAD, OR OF HURTING YOURSELF: NOT AT ALL
3. TROUBLE FALLING OR STAYING ASLEEP: NOT AT ALL
SUM OF ALL RESPONSES TO PHQ QUESTIONS 1-9: 6
5. POOR APPETITE OR OVEREATING: NEARLY EVERY DAY
1. LITTLE INTEREST OR PLEASURE IN DOING THINGS: SEVERAL DAYS
SUM OF ALL RESPONSES TO PHQ QUESTIONS 1-9: 6

## 2025-01-14 NOTE — PROGRESS NOTES
Petra Castrejon presents today for   Chief Complaint   Patient presents with    1 Month Follow-Up       Is someone accompanying this pt? no    Is the patient using any DME equipment during OV? no    Health Maintenance Due   Topic Date Due    Pneumococcal 0-64 years Vaccine (1 of 2 - PCV) Never done    Varicella vaccine (1 of 2 - 13+ 2-dose series) Never done    Hepatitis B vaccine (1 of 3 - 19+ 3-dose series) Never done    DTaP/Tdap/Td vaccine (1 - Tdap) Never done    Flu vaccine (1) Never done    COVID-19 Vaccine (1 - 2023-24 season) Never done         \"Have you been to the ER, urgent care clinic since your last visit?  Hospitalized since your last visit?\"    NO    “Have you seen or consulted any other health care providers outside our system since your last visit?”    NO           
Sitting   Cuff Size: Medium Adult   Pulse: 82   Resp: 18   Temp: 98.1 °F (36.7 °C)   TempSrc: Temporal   SpO2: 94%   Weight: 41 kg (90 lb 6.4 oz)   Height: 1.499 m (4' 11\")      Body mass index is 18.26 kg/m².     Gen: NAD, well appearing   Psych: cooperative. Appropriate mood and affect. Slight tremor. Slight sweating. + some fidgeting.     Medical decision making complexity: moderate    I have discussed the diagnosis with the patient and the intended plan as seen in the above orders.  The patient has received an after-visit summary and questions were answered concerning future plans.  I have discussed medication side effects and warnings with the patient as well. I have reviewed the plan of care with the patient, accepted their input and they are in agreement with the treatment goals. Previous lab and imaging results were reviewed by me.     Gracie Caraballo MD   Family Medicine  
Wounds

## 2025-01-15 ENCOUNTER — TELEMEDICINE (OUTPATIENT)
Facility: CLINIC | Age: 45
End: 2025-01-15
Payer: MEDICAID

## 2025-01-15 DIAGNOSIS — F10.139 ALCOHOL ABUSE WITH WITHDRAWAL (HCC): Primary | ICD-10-CM

## 2025-01-15 PROCEDURE — 99214 OFFICE O/P EST MOD 30 MIN: CPT | Performed by: STUDENT IN AN ORGANIZED HEALTH CARE EDUCATION/TRAINING PROGRAM

## 2025-01-15 NOTE — PROGRESS NOTES
Petra Castrejon presents today for   Chief Complaint   Patient presents with    Follow-up       Is someone accompanying this pt? No     Is the patient using any DME equipment during OV? no    Health Maintenance Due   Topic Date Due    Pneumococcal 0-64 years Vaccine (1 of 2 - PCV) Never done    Varicella vaccine (1 of 2 - 13+ 2-dose series) Never done    Hepatitis B vaccine (1 of 3 - 19+ 3-dose series) Never done    DTaP/Tdap/Td vaccine (1 - Tdap) Never done    Flu vaccine (1) Never done    COVID-19 Vaccine (1 - 2023-24 season) Never done         \"Have you been to the ER, urgent care clinic since your last visit?  Hospitalized since your last visit?\"    NO    “Have you seen or consulted any other health care providers outside our system since your last visit?”    NO

## 2025-01-15 NOTE — PROGRESS NOTES
SICK VISIT     Petra Castrejon (: 1980) is a 44 y.o. female here for evaluation of the following chief concerns(s):  Follow-up       ASSESSMENT/PLAN:  1. Alcohol abuse with withdrawal (HCC)    No orders of the defined types were placed in this encounter.    She is doing well with the benzo taper-will continue.  After tonight she will space to 10 mg of diazepam every 8 hours.  She has already restarted her naltrexone and tolerating well.    Will see her back tomorrow to check in.    Patient agrees with plan as above and has no additional questions at this time.     SUBJECTIVE/OBJECTIVE:    Patient presents for follow-up.  We started a benzo taper for alcohol withdrawal symptoms yesterday.  It has helped her significantly.  She still had a little bit of light sweating near the end of the 6 hours before she took another dose, but the anxiety/agitation, nausea and insomnia were much more tolerable than in the past when she has tried to quit cold turkey.  Last drink was now over 48 hours ago.    There were no vitals filed for this visit.   There is no height or weight on file to calculate BMI.     Gen: NAD, well appearing   Psych: cooperative. Appropriate mood and affect.    Medical decision making complexity: moderate    I have discussed the diagnosis with the patient and the intended plan as seen in the above orders.  The patient has received an after-visit summary and questions were answered concerning future plans.  I have discussed medication side effects and warnings with the patient as well. I have reviewed the plan of care with the patient, accepted their input and they are in agreement with the treatment goals. Previous lab and imaging results were reviewed by me.     Gracie Caraballo MD   Family Medicine

## 2025-01-16 ENCOUNTER — TELEPHONE (OUTPATIENT)
Facility: CLINIC | Age: 45
End: 2025-01-16

## 2025-01-16 ENCOUNTER — TELEMEDICINE (OUTPATIENT)
Facility: CLINIC | Age: 45
End: 2025-01-16
Payer: MEDICAID

## 2025-01-16 DIAGNOSIS — F10.139 ALCOHOL ABUSE WITH WITHDRAWAL (HCC): ICD-10-CM

## 2025-01-16 PROCEDURE — 99214 OFFICE O/P EST MOD 30 MIN: CPT | Performed by: STUDENT IN AN ORGANIZED HEALTH CARE EDUCATION/TRAINING PROGRAM

## 2025-01-16 RX ORDER — DIAZEPAM 10 MG/1
TABLET ORAL
Qty: 5 TABLET | Refills: 0 | Status: SHIPPED | OUTPATIENT
Start: 2025-01-16 | End: 2025-01-20

## 2025-01-16 NOTE — PROGRESS NOTES
Petra Castrejon, was evaluated through a synchronous (real-time) audio-video encounter. The patient (or guardian if applicable) is aware that this is a billable service, which includes applicable co-pays. This Virtual Visit was conducted with patient's (and/or legal guardian's) consent. Patient identification was verified, and a caregiver was present when appropriate.   The patient was located at Home: [address] [place], [place] [address]  Provider was located at Facility (Appt Dept): [address] [place], [place] [address]  Confirm you are appropriately licensed, registered, or certified to deliver care in the state where the patient is located as indicated above. If you are not or unsure, please re-schedule the visit: Yes, I confirm.     Petra Castrejon (:  1980) is a Established patient, presenting virtually for evaluation of the following:      Below is the assessment and plan developed based on review of pertinent history, physical exam, labs, studies, and medications.     Assessment & Plan  Alcohol abuse with withdrawal (HCC)       Orders:    diazePAM (VALIUM) 10 MG tablet; Day 1- take 1 pill every 6 hours. Day 2- take 1 pill every 8 hours. Day 3 take 1 pill every 12 hours. Day 4- take 1 pill at night    Patient is still really struggling with anxiety.  Other withdrawal symptoms are well-controlled.  She absolutely needs some social support.  I have asked her to please call the number for [facility] group to see if she can get involved in the groups.  If she cannot get involved with these immediately-I suggest she look on [place] [employer] to see if she can join a meeting today-there are plenty on Zoom if she does not have transportation to get there.    We discussed box breathing and practiced one round together. This can be a great way to stimulate the vagus nerve and PNS to stop a panic attack. I also gave her info for [employer]ary that she can download and explore

## 2025-01-16 NOTE — TELEPHONE ENCOUNTER
[facility] pharmacy needs clarification for the Diazepam.  Quantity does not match the directions.

## 2025-01-17 ENCOUNTER — TELEPHONE (OUTPATIENT)
Facility: CLINIC | Age: 45
End: 2025-01-17

## 2025-01-17 DIAGNOSIS — F10.139 ALCOHOL ABUSE WITH WITHDRAWAL (HCC): ICD-10-CM

## 2025-01-17 RX ORDER — DIAZEPAM 10 MG/1
TABLET ORAL
Qty: 5 TABLET | Refills: 0 | Status: CANCELLED | OUTPATIENT
Start: 2025-01-17 | End: 2025-01-21

## 2025-01-17 NOTE — TELEPHONE ENCOUNTER
Previous encounter:   [facility] pharmacy needs clarification for the Diazepam. Quantity does not match the directions. 1/16/25 Dr. Caraballo asked can you call them back and tell them that I added a few more pills because she is on day 2 and we have changed to continue q8h dosing for today and tomorrow and then continue taper.     Patient states she has still been unable to get medicine due to needing clarification. Can you please resend her Diazepam to [facility]? Called and spoke to patient and she states she picked up RX on Tuesday and took every 6 hrs on Tuesday, Wednesday every 8 hours, spoke to Dr Caraballo on Thursday and she was sent another RX yesterday and patient tried to  this morning at [facility] and was told it was an insurance issue.

## 2025-01-20 ENCOUNTER — TELEMEDICINE (OUTPATIENT)
Facility: CLINIC | Age: 45
End: 2025-01-20
Payer: MEDICAID

## 2025-01-20 DIAGNOSIS — F41.1 GENERALIZED ANXIETY DISORDER: Primary | ICD-10-CM

## 2025-01-20 DIAGNOSIS — F10.139 ALCOHOL ABUSE WITH WITHDRAWAL (HCC): ICD-10-CM

## 2025-01-20 DIAGNOSIS — F41.0 PANIC ATTACKS: ICD-10-CM

## 2025-01-20 PROCEDURE — 99214 OFFICE O/P EST MOD 30 MIN: CPT | Performed by: STUDENT IN AN ORGANIZED HEALTH CARE EDUCATION/TRAINING PROGRAM

## 2025-01-20 RX ORDER — DIAZEPAM 5 MG/1
TABLET ORAL
Qty: 7 TABLET | Refills: 0 | Status: SHIPPED | OUTPATIENT
Start: 2025-01-20 | End: 2025-01-24

## 2025-01-20 NOTE — PROGRESS NOTES
Petra Castrejon presents today for   Chief Complaint   Patient presents with    Follow-up    4 day follow up              Is someone accompanying this pt? No No     Is the patient using any DME equipment during OV? no    Health Maintenance Due   Topic Date Due    Pneumococcal 0-64 years Vaccine (1 of 2 - PCV) Never done    Varicella vaccine (1 of 2 - 13+ 2-dose series) Never done    Hepatitis B vaccine (1 of 3 - 19+ 3-dose series) Never done    DTaP/Tdap/Td vaccine (1 - Tdap) Never done    Flu vaccine (1) Never done    COVID-19 Vaccine (1 - 2023-24 season) Never done         \"Have you been to the ER, urgent care clinic since your last visit?  Hospitalized since your last visit?\"    NO    “Have you seen or consulted any other health care providers outside our system since your last visit?”    NO           
AA or any sort of groups.    Will see her back next week.  I am going to write her for 7 days of 5mg  Valium to use for severe panic attacks.    Return in about 1 week (around 1/27/2025).       Subjective   HPI  Review of Systems     Patient presents for follow-up.  She has completed diazepam taper for alcohol withdrawal symptoms.  She has not had anything to drink.  She is struggling with pretty severe anxiety-she had been self-medicating for her anxiety with alcohol.  Otherwise no withdrawal symptoms.    She has tried multiple SSRIs in the past and she states they have made her anxiety worse.  She tried BuSpar which did not help.  She has tried hydroxyzine for panic attacks which did not work either.  She tried gabapentin for both alcohol cravings and anxiety and did not like the way it made her feel.    The box breathing that we discussed last visit does help some to antonette her panic attacks, however sometimes they become full-fledged.  She is having multiple per day.          Objective   Patient-Reported Vitals  No data recorded     Physical Exam             --Gracie Caraballo MD

## 2025-01-28 ENCOUNTER — TELEMEDICINE (OUTPATIENT)
Facility: CLINIC | Age: 45
End: 2025-01-28
Payer: MEDICAID

## 2025-01-28 DIAGNOSIS — F10.139 ALCOHOL ABUSE WITH WITHDRAWAL (HCC): ICD-10-CM

## 2025-01-28 DIAGNOSIS — F41.1 GENERALIZED ANXIETY DISORDER: Primary | ICD-10-CM

## 2025-01-28 PROCEDURE — 99214 OFFICE O/P EST MOD 30 MIN: CPT | Performed by: STUDENT IN AN ORGANIZED HEALTH CARE EDUCATION/TRAINING PROGRAM

## 2025-02-05 ENCOUNTER — TELEMEDICINE (OUTPATIENT)
Facility: CLINIC | Age: 45
End: 2025-02-05
Payer: MEDICAID

## 2025-02-05 DIAGNOSIS — F41.1 GENERALIZED ANXIETY DISORDER: ICD-10-CM

## 2025-02-05 DIAGNOSIS — F10.90 HEAVY ALCOHOL USE: Primary | ICD-10-CM

## 2025-02-05 PROCEDURE — 99214 OFFICE O/P EST MOD 30 MIN: CPT | Performed by: STUDENT IN AN ORGANIZED HEALTH CARE EDUCATION/TRAINING PROGRAM

## 2025-02-05 NOTE — ASSESSMENT & PLAN NOTE
She has done a great job following through with the plan, amazing job with abstaining from alcohol.  Continue naltrexone-lets try splitting up the dose to 50 mg in the morning 50 mg in the afternoon/evening to help with her cravings.  Would continue the trazodone, has follow-up with psychiatry in a couple of weeks  I have taught her some breathing exercises which she still has, encouraged her to use these to help with panic attacks.    Will follow-up weekly virtually.

## 2025-02-05 NOTE — PROGRESS NOTES
Petra Castrejon, was evaluated through a synchronous (real-time) audio-video encounter. The patient (or guardian if applicable) is aware that this is a billable service, which includes applicable co-pays. This Virtual Visit was conducted with patient's (and/or legal guardian's) consent. Patient identification was verified, and a caregiver was present when appropriate.   The patient was located at Home: [address] [place] [id number]  Provider was located at Facility (Appt Dept): [address] [place], [place] [id number]  Confirm you are appropriately licensed, registered, or certified to deliver care in the state where the patient is located as indicated above. If you are not or unsure, please re-schedule the visit: Yes, I confirm.     Petra Castrejon (:  1980) is a Established patient, presenting virtually for evaluation of the following:      Below is the assessment and plan developed based on review of pertinent history, physical exam, labs, studies, and medications.     Assessment & Plan  Heavy alcohol use            Generalized anxiety disorder          She has done a great job following through with the plan, amazing job with abstaining from alcohol.  Continue naltrexone-lets try splitting up the dose to 50 mg in the morning 50 mg in the afternoon/evening to help with her cravings.  Would continue the trazodone, has follow-up with psychiatry in a couple of weeks  I have taught her some breathing exercises which she still has, encouraged her to use these to help with panic attacks.    Will follow-up weekly virtually.      No follow-ups on file.       Subjective   HPI  Review of Systems     Patient presents for follow-up.  She saw the team at \[facility]\"" last week.  She met with a nurse psychiatrist and a .  She has an appointment for follow-up on  with the psychiatrist, they will be contacting her about their addiction groups that are 4 times per week, we will be

## 2025-02-12 ENCOUNTER — TELEMEDICINE (OUTPATIENT)
Facility: CLINIC | Age: 45
End: 2025-02-12
Payer: MEDICAID

## 2025-02-12 DIAGNOSIS — F41.9 ANXIETY: Primary | ICD-10-CM

## 2025-02-12 DIAGNOSIS — F10.139 ALCOHOL ABUSE WITH WITHDRAWAL (HCC): ICD-10-CM

## 2025-02-12 PROCEDURE — 99214 OFFICE O/P EST MOD 30 MIN: CPT | Performed by: STUDENT IN AN ORGANIZED HEALTH CARE EDUCATION/TRAINING PROGRAM

## 2025-02-12 RX ORDER — TRAZODONE HYDROCHLORIDE 50 MG/1
TABLET, FILM COATED ORAL
COMMUNITY
Start: 2025-02-01

## 2025-02-12 NOTE — PROGRESS NOTES
Petra Castrejon presents today for   Chief Complaint   Patient presents with    1 week follow up       Is someone accompanying this pt? no    Is the patient using any DME equipment during OV? no    Health Maintenance Due   Topic Date Due    Varicella vaccine (1 of 2 - 13+ 2-dose series) Never done    Hepatitis B vaccine (1 of 3 - 19+ 3-dose series) Never done    DTaP/Tdap/Td vaccine (1 - Tdap) Never done    Pneumococcal 0-49 years Vaccine (1 of 2 - PCV) Never done    Flu vaccine (1) Never done    COVID-19 Vaccine (1 - 2024-25 season) Never done         \"Have you been to the ER, urgent care clinic since your last visit?  Hospitalized since your last visit?\"    NO    “Have you seen or consulted any other health care providers outside our system since your last visit?”    NO

## 2025-02-13 NOTE — PROGRESS NOTES
Petra Castrejon, was evaluated through a synchronous (real-time) audio-video encounter. The patient (or guardian if applicable) is aware that this is a billable service, which includes applicable co-pays. This Virtual Visit was conducted with patient's (and/or legal guardian's) consent. Patient identification was verified, and a caregiver was present when appropriate.   The patient was located at Home: [address] [place] [id number]  Provider was located at Facility (Appt Dept): [address] [place], [place] [id number]  Confirm you are appropriately licensed, registered, or certified to deliver care in the state where the patient is located as indicated above. If you are not or unsure, please re-schedule the visit: Yes, I confirm.     Petra Castrejon (:  1980) is a Established patient, presenting virtually for evaluation of the following:      Below is the assessment and plan developed based on review of pertinent history, physical exam, labs, studies, and medications.     Assessment & Plan  Anxiety            Anxiety/sleep improving some with the trazodone.  She has follow-up with her new psychiatrist tomorrow.  Will continue to follow along closely.  Alcohol abuse with withdrawal (HCC)       She continues to abstain from alcohol use which is fantastic.  Taking the naltrexone 50 mg in the morning and 50 mg at night seems to be helping much more with cravings.         No follow-ups on file.       Subjective   HPI  Review of Systems     Patient presents for follow-up.  He is actually feeling better.  She feels like the trazodone is helping with her anxiety.  No significant side effects.  No alcohol consumption in over 2 weeks now, fantastic.  Still following with [facility] psychiatry-Ashley Bailon is the nurse practitioner that is managing her medications.    Objective   Patient-Reported Vitals  No data recorded     Physical Exam             --Gracie Caraballo MD

## 2025-02-26 ENCOUNTER — TELEMEDICINE (OUTPATIENT)
Facility: CLINIC | Age: 45
End: 2025-02-26
Payer: MEDICAID

## 2025-02-26 DIAGNOSIS — F41.9 ANXIETY: Primary | ICD-10-CM

## 2025-02-26 DIAGNOSIS — F41.0 PANIC ATTACKS: ICD-10-CM

## 2025-02-26 DIAGNOSIS — F10.139 ALCOHOL ABUSE WITH WITHDRAWAL (HCC): ICD-10-CM

## 2025-02-26 PROCEDURE — 99214 OFFICE O/P EST MOD 30 MIN: CPT | Performed by: STUDENT IN AN ORGANIZED HEALTH CARE EDUCATION/TRAINING PROGRAM

## 2025-02-26 RX ORDER — TRIAMCINOLONE ACETONIDE 1 MG/G
OINTMENT TOPICAL 2 TIMES DAILY PRN
Qty: 30 G | Refills: 1 | Status: SHIPPED | OUTPATIENT
Start: 2025-02-26

## 2025-02-26 NOTE — PROGRESS NOTES
Petra Castrejon, was evaluated through a synchronous (real-time) audio-video encounter. The patient (or guardian if applicable) is aware that this is a billable service, which includes applicable co-pays. This Virtual Visit was conducted with patient's (and/or legal guardian's) consent. Patient identification was verified, and a caregiver was present when appropriate.   The patient was located at Home: [address] [place] [address]  Provider was located at Facility (Appt Dept): [address] [place], [place] [id number]  Confirm you are appropriately licensed, registered, or certified to deliver care in the state where the patient is located as indicated above. If you are not or unsure, please re-schedule the visit: Yes, I confirm.     Petra Castrejon (:  1980) is a Established patient, presenting virtually for evaluation of the following:      Below is the assessment and plan developed based on review of pertinent history, physical exam, labs, studies, and medications.     Assessment & Plan  Anxiety            Alcohol abuse with withdrawal (HCC)            Panic attacks          Anxiety and insomnia improving some with the trazodone.  She is tolerating well.  Her psychiatrist started her BuSpar, but she does not want to take another medication every day.  We went over the breathing exercises that I taught her several weeks back again for panic attacks.  She is waiting to get in with an intensive therapy program-sort of discouraging that she has not heard back from them and it has been over a month, I asked her to call them and see what is going on.    Alcohol abuse-has been totally abstinent from alcohol use for the past 3 weeks, longest she has ever been.  Fantastic work so proud of her.  She is proud of herself also.  Continue the naltrexone.  It seems to work better when we split up the dose morning and night.    No follow-ups on file.       Subjective   HPI  Review of Systems    INTERVENTIONAL RADIOLOGY PROCEDURE NOTE    Date: 6/3/2022    Procedure: Lumbar puncture    Preoperative diagnosis:   1  Visual changes    2  Diplopia    3  Right-sided chest wall pain    4  Alcoholism ([address] )    5  Anxiety    6  Right flank pain         Postoperative diagnosis: Same  Surgeon: Santos Toro MD     Assistant: None  No qualified resident was available  Blood loss: None    Specimens: 16 5 mL of clear CSF    Findings: Fluoroscopic guided lumbar puncture at the L2-L3 interspace with clear CSF obtained  Opening pressure of 9 cm X7N    Complications: None immediate      Anesthesia: local

## 2025-03-05 ENCOUNTER — TELEMEDICINE (OUTPATIENT)
Facility: CLINIC | Age: 45
End: 2025-03-05
Payer: MEDICAID

## 2025-03-05 DIAGNOSIS — F10.90 HEAVY ALCOHOL USE: ICD-10-CM

## 2025-03-05 DIAGNOSIS — F41.1 GENERALIZED ANXIETY DISORDER: ICD-10-CM

## 2025-03-05 DIAGNOSIS — F33.2 SEVERE EPISODE OF RECURRENT MAJOR DEPRESSIVE DISORDER, WITHOUT PSYCHOTIC FEATURES (HCC): Primary | ICD-10-CM

## 2025-03-05 PROCEDURE — 99214 OFFICE O/P EST MOD 30 MIN: CPT | Performed by: STUDENT IN AN ORGANIZED HEALTH CARE EDUCATION/TRAINING PROGRAM

## 2025-03-05 NOTE — PROGRESS NOTES
Petra Castrejon, was evaluated through a synchronous (real-time) audio-video encounter. The patient (or guardian if applicable) is aware that this is a billable service, which includes applicable co-pays. This Virtual Visit was conducted with patient's (and/or legal guardian's) consent. Patient identification was verified, and a caregiver was present when appropriate.   The patient was located at Home: [address] [place] [id number]  Provider was located at Facility (Appt Dept): [address] [place], [place] [id number]  Confirm you are appropriately licensed, registered, or certified to deliver care in the state where the patient is located as indicated above. If you are not or unsure, please re-schedule the visit: Yes, I confirm.     Petra Castrejon (:  1980) is a Established patient, presenting virtually for evaluation of the following:      Below is the assessment and plan developed based on review of pertinent history, physical exam, labs, studies, and medications.     Assessment & Plan  Severe episode of recurrent major depressive disorder, without psychotic features (HCC)            Heavy alcohol use            Generalized anxiety disorder          Alcohol use disorder-she has come so far, so proud of her!  Continue naltrexone which seems to be helping significantly with cravings.  I encouraged her again to reach back out to [facility] to see if she can get into some support groups, peer Support is so important in sobriety journey.     Anxiety and depression seem to be improving with the trazodone.  I will follow along with psychiatry.    No follow-ups on file.       Subjective   HPI  Review of Systems     Patient presents for follow-up.  She has been totally sober from alcohol since .  It is still a struggle some days, but she is proud of herself and wants to continue doing it.  Referral has not followed up with the annual wellness-they were supposed to get her enrolled

## 2025-03-05 NOTE — ASSESSMENT & PLAN NOTE
Alcohol use disorder-she has come so far, so proud of her!  Continue naltrexone which seems to be helping significantly with cravings.  I encouraged her again to reach back out to [facility] to see if she can get into some support groups, peer Support is so important in sobriety journey.     Anxiety and depression seem to be improving with the trazodone.  I will follow along with psychiatry.

## 2025-03-27 RX ORDER — SPIRONOLACTONE 25 MG/1
25 TABLET ORAL DAILY
Qty: 90 TABLET | Refills: 1 | Status: SHIPPED | OUTPATIENT
Start: 2025-03-27

## 2025-04-14 ENCOUNTER — OFFICE VISIT (OUTPATIENT)
Facility: CLINIC | Age: 45
End: 2025-04-14
Payer: MEDICAID

## 2025-04-14 ENCOUNTER — HOSPITAL ENCOUNTER (OUTPATIENT)
Age: 45
Discharge: HOME | End: 2025-04-17

## 2025-04-14 VITALS
OXYGEN SATURATION: 95 % | DIASTOLIC BLOOD PRESSURE: 80 MMHG | WEIGHT: 95.4 LBS | BODY MASS INDEX: 19.23 KG/M2 | SYSTOLIC BLOOD PRESSURE: 123 MMHG | HEIGHT: 59 IN | RESPIRATION RATE: 18 BRPM | HEART RATE: 77 BPM

## 2025-04-14 DIAGNOSIS — E55.9 VITAMIN D DEFICIENCY: ICD-10-CM

## 2025-04-14 DIAGNOSIS — R07.9 CHEST PAIN, UNSPECIFIED TYPE: ICD-10-CM

## 2025-04-14 DIAGNOSIS — J44.9 SUSPECTED CHRONIC OBSTRUCTIVE PULMONARY DISEASE BASED ON INITIAL EVALUATION (HCC): ICD-10-CM

## 2025-04-14 DIAGNOSIS — I10 ESSENTIAL HYPERTENSION: ICD-10-CM

## 2025-04-14 DIAGNOSIS — Z00.00 ENCOUNTER FOR PHYSICAL EXAMINATION: Primary | ICD-10-CM

## 2025-04-14 DIAGNOSIS — F17.200 TOBACCO USE DISORDER, SEVERE, DEPENDENCE: ICD-10-CM

## 2025-04-14 DIAGNOSIS — D50.0 IRON DEFICIENCY ANEMIA DUE TO CHRONIC BLOOD LOSS: ICD-10-CM

## 2025-04-14 DIAGNOSIS — Z12.31 ENCOUNTER FOR SCREENING MAMMOGRAM FOR MALIGNANT NEOPLASM OF BREAST: ICD-10-CM

## 2025-04-14 LAB — LABCORP DRAW FEE: NORMAL

## 2025-04-14 PROCEDURE — 3074F SYST BP LT 130 MM HG: CPT | Performed by: STUDENT IN AN ORGANIZED HEALTH CARE EDUCATION/TRAINING PROGRAM

## 2025-04-14 PROCEDURE — 99214 OFFICE O/P EST MOD 30 MIN: CPT | Performed by: STUDENT IN AN ORGANIZED HEALTH CARE EDUCATION/TRAINING PROGRAM

## 2025-04-14 PROCEDURE — 99396 PREV VISIT EST AGE 40-64: CPT | Performed by: STUDENT IN AN ORGANIZED HEALTH CARE EDUCATION/TRAINING PROGRAM

## 2025-04-14 PROCEDURE — 3079F DIAST BP 80-89 MM HG: CPT | Performed by: STUDENT IN AN ORGANIZED HEALTH CARE EDUCATION/TRAINING PROGRAM

## 2025-04-14 PROCEDURE — 99001 SPECIMEN HANDLING PT-LAB: CPT

## 2025-04-14 RX ORDER — FLUTICASONE PROPIONATE 50 MCG
2 SPRAY, SUSPENSION (ML) NASAL DAILY
Qty: 16 G | Refills: 1 | Status: SHIPPED | OUTPATIENT
Start: 2025-04-14

## 2025-04-14 ASSESSMENT — ENCOUNTER SYMPTOMS
RESPIRATORY NEGATIVE: 1
GASTROINTESTINAL NEGATIVE: 1

## 2025-04-14 NOTE — PROGRESS NOTES
\"Have you been to the ER, urgent care clinic since your last visit?  Hospitalized since your last visit?\"    NO    “Have you seen or consulted any other health care providers outside our system since your last visit?”    NO           
some PND causing chronic cough over the past couple of weeks from the pollen- start Flonase nasal spray.     - yearly labs per above. Recommend tetanus booster and PCV20, she is going [facility] these [facility]. Mammogram ordered.       No follow-ups on file.       Subjective   History:    Patient presents for physical.  She is feeling well overall.  She does complain of a right sided chest pain right in her ribs that happens mostly when she coughs.  It feels like an aching.  Has been going on for at least a couple of months now.  Cough has been for the past couple of weeks more so.  She complains of runny nose and feeling of ears being clogged no fevers or chills or other sick symptoms.  She has a heavy smoking history.  No weight loss or night sweats.  She has mostly maintained sobriety, says she slipped up 1 time since January.  She is still having fairly hefty cravings-the naltrexone is helping some with these.  She was lost to follow-up to [facility] addiction group-feels likely abandon her.  We had a long discussion about this, she really needs to show them some initiative I think and call them back to get back in.    HTN   Complications: None  Compliant with current medications, tolerating well.      Iron deficiency anemia  Very heavy periods- 7 days of heavy bleeding with clots  Not on iron currently  Low ferritin, low iron  Used to see GYN- plan was EMB + probably total hysterectomy- lost to follow up during the pandemic. [facility], Dr. Yanez     Anxiety/depression, ADHD- not on anything currently. Self medicates by smoking marijuana. Feels like she can never relax     20 years smoking history-  5 cigarettes/day currently- working on cutting back. Tried Chantix in the past- made her \"crazy\"  Also smokes marijuana  Drinks a 12 pack per day- stopped in January, has maintained sobriety.   Mammogram 5/2024- negative    Review of Systems   Constitutional: Negative.

## 2025-04-15 ENCOUNTER — RESULTS FOLLOW-UP (OUTPATIENT)
Facility: CLINIC | Age: 45
End: 2025-04-15

## 2025-04-15 LAB
25(OH)D3+25(OH)D2 SERPL-MCNC: 24 NG/ML (ref 30–100)
ALBUMIN SERPL-MCNC: 4.2 G/DL (ref 3.9–4.9)
ALP SERPL-CCNC: 67 IU/L (ref 44–121)
ALT SERPL-CCNC: 6 IU/L (ref 0–32)
AST SERPL-CCNC: 18 IU/L (ref 0–40)
BASOPHILS # BLD AUTO: 0.1 X10E3/UL (ref 0–0.2)
BASOPHILS NFR BLD AUTO: 1 %
BILIRUB SERPL-MCNC: <0.2 MG/DL (ref 0–1.2)
BUN SERPL-MCNC: 7 MG/DL (ref 6–24)
BUN/CREAT SERPL: 11 (ref 9–23)
CALCIUM SERPL-MCNC: 9.1 MG/DL (ref 8.7–10.2)
CHLORIDE SERPL-SCNC: 104 MMOL/L (ref 96–106)
CHOLEST SERPL-MCNC: 175 MG/DL (ref 100–199)
CO2 SERPL-SCNC: 21 MMOL/L (ref 20–29)
CREAT SERPL-MCNC: 0.66 MG/DL (ref 0.57–1)
EGFRCR SERPLBLD CKD-EPI 2021: 112 ML/MIN/1.73
EOSINOPHIL # BLD AUTO: 0.4 X10E3/UL (ref 0–0.4)
EOSINOPHIL NFR BLD AUTO: 4 %
ERYTHROCYTE [DISTWIDTH] IN BLOOD BY AUTOMATED COUNT: 12.7 % (ref 11.7–15.4)
FERRITIN SERPL-MCNC: 20 NG/ML (ref 15–150)
GLOBULIN SER CALC-MCNC: 2.2 G/DL (ref 1.5–4.5)
GLUCOSE SERPL-MCNC: 93 MG/DL (ref 70–99)
HCT VFR BLD AUTO: 38.9 % (ref 34–46.6)
HDLC SERPL-MCNC: 49 MG/DL
HGB BLD-MCNC: 13.2 G/DL (ref 11.1–15.9)
IMM GRANULOCYTES # BLD AUTO: 0 X10E3/UL (ref 0–0.1)
IMM GRANULOCYTES NFR BLD AUTO: 0 %
IRON SATN MFR SERPL: 14 % (ref 15–55)
IRON SERPL-MCNC: 49 UG/DL (ref 27–159)
LDLC SERPL CALC-MCNC: 101 MG/DL (ref 0–99)
LYMPHOCYTES # BLD AUTO: 2.3 X10E3/UL (ref 0.7–3.1)
LYMPHOCYTES NFR BLD AUTO: 26 %
MCH RBC QN AUTO: 32.9 PG (ref 26.6–33)
MCHC RBC AUTO-ENTMCNC: 33.9 G/DL (ref 31.5–35.7)
MCV RBC AUTO: 97 FL (ref 79–97)
MONOCYTES # BLD AUTO: 0.9 X10E3/UL (ref 0.1–0.9)
MONOCYTES NFR BLD AUTO: 10 %
NEUTROPHILS # BLD AUTO: 5.3 X10E3/UL (ref 1.4–7)
NEUTROPHILS NFR BLD AUTO: 59 %
PLATELET # BLD AUTO: 305 X10E3/UL (ref 150–450)
POTASSIUM SERPL-SCNC: 4.3 MMOL/L (ref 3.5–5.2)
PROT SERPL-MCNC: 6.4 G/DL (ref 6–8.5)
RBC # BLD AUTO: 4.01 X10E6/UL (ref 3.77–5.28)
SODIUM SERPL-SCNC: 142 MMOL/L (ref 134–144)
TIBC SERPL-MCNC: 342 UG/DL (ref 250–450)
TRIGL SERPL-MCNC: 141 MG/DL (ref 0–149)
UIBC SERPL-MCNC: 293 UG/DL (ref 131–425)
VLDLC SERPL CALC-MCNC: 25 MG/DL (ref 5–40)
WBC # BLD AUTO: 8.9 X10E3/UL (ref 3.4–10.8)

## 2025-06-04 ENCOUNTER — OFFICE VISIT (OUTPATIENT)
Facility: CLINIC | Age: 45
End: 2025-06-04
Payer: MEDICAID

## 2025-06-04 VITALS
WEIGHT: 92.8 LBS | RESPIRATION RATE: 18 BRPM | SYSTOLIC BLOOD PRESSURE: 107 MMHG | DIASTOLIC BLOOD PRESSURE: 70 MMHG | HEIGHT: 59 IN | HEART RATE: 72 BPM | BODY MASS INDEX: 18.71 KG/M2 | TEMPERATURE: 96.9 F | OXYGEN SATURATION: 96 %

## 2025-06-04 DIAGNOSIS — M25.521 BILATERAL ELBOW JOINT PAIN: ICD-10-CM

## 2025-06-04 DIAGNOSIS — F41.1 GENERALIZED ANXIETY DISORDER: Primary | ICD-10-CM

## 2025-06-04 DIAGNOSIS — M25.522 BILATERAL ELBOW JOINT PAIN: ICD-10-CM

## 2025-06-04 PROCEDURE — 3074F SYST BP LT 130 MM HG: CPT | Performed by: STUDENT IN AN ORGANIZED HEALTH CARE EDUCATION/TRAINING PROGRAM

## 2025-06-04 PROCEDURE — 3078F DIAST BP <80 MM HG: CPT | Performed by: STUDENT IN AN ORGANIZED HEALTH CARE EDUCATION/TRAINING PROGRAM

## 2025-06-04 PROCEDURE — 99214 OFFICE O/P EST MOD 30 MIN: CPT | Performed by: STUDENT IN AN ORGANIZED HEALTH CARE EDUCATION/TRAINING PROGRAM

## 2025-06-04 RX ORDER — METHYLPREDNISOLONE 4 MG/1
TABLET ORAL
Qty: 1 KIT | Refills: 0 | Status: SHIPPED | OUTPATIENT
Start: 2025-06-04 | End: 2025-06-10

## 2025-06-04 RX ORDER — TRAZODONE HYDROCHLORIDE 50 MG/1
50 TABLET ORAL NIGHTLY
Qty: 90 TABLET | Refills: 1 | Status: SHIPPED | OUTPATIENT
Start: 2025-06-04

## 2025-06-04 RX ORDER — IBUPROFEN 800 MG/1
800 TABLET, FILM COATED ORAL 2 TIMES DAILY PRN
Qty: 60 TABLET | Refills: 1 | Status: SHIPPED | OUTPATIENT
Start: 2025-06-04

## 2025-06-04 RX ORDER — SENNOSIDES 8.6 MG
650 CAPSULE ORAL EVERY 8 HOURS PRN
Qty: 60 TABLET | Refills: 3 | Status: SHIPPED | OUTPATIENT
Start: 2025-06-04

## 2025-06-04 NOTE — PROGRESS NOTES
SICK VISIT     Petra Castrejon (: 1980) is a 45 y.o. female here for evaluation of the following chief concerns(s):  Follow-up (1m follow up)       ASSESSMENT/PLAN:  1. Generalized anxiety disorder  -     traZODone (DESYREL) 50 MG tablet; Take 1 tablet by mouth nightly, Disp-90 tablet, R-1Normal  2. Bilateral elbow joint pain  -     methylPREDNISolone (MEDROL DOSEPACK) 4 MG tablet; Take by mouth., Disp-1 kit, R-0Normal  -     Uric Acid; Future    Orders Placed This Encounter   Procedures    Uric Acid     Standing Status:   Future     Expected Date:   2025     Expiration Date:   2026     Perimenopause symptoms- has apt with GYN for full evaluation. Unfortunately she is a poor candidate for any hormonal treatment, she has not tolerated any SSRI's for her anxiety in the past. Will follow along.     Bilateral elbow pain- has pending MRI from ortho, I do recommend she go ahead and have this done. Will r/o gout with uric acid. Treat with medrol dose pack for acute flare of the pain in the meantime.     Anxiety- improved with Trazodone. Not interested in trying anything else. Still self treating with marijuana.    FU as scheduled for chronic disease    Patient agrees with plan as above and has no additional questions at this time.     SUBJECTIVE/OBJECTIVE:    Patient presents for follow up  She states that she thinks she is in premenopause  Has not had a period in 3 months. S/P tubal ligation  + hot flashes, + worsening anxiety  She has an apt with GYN on    TSH normal in     Her elbows are still bothering her. Really bad over the last couple of weeks. She had injections >1 year ago for medial epicondylitis which did not help really at all. She then saw [facility] for a second opinion and has MRI's pending. The elbows get swollen, red and hot at times. No h/o gout. NO sign of arthritis on the XR's.     Vitals:    25 1101   BP: 107/70   Pulse: 72   Resp: 18   Temp: 96.9 °F (36.1 °C)   TempSrc:

## 2025-06-04 NOTE — PROGRESS NOTES
Petra Castrejon presents today for   Chief Complaint   Patient presents with    Follow-up     1m follow up       Is someone accompanying this pt? no    Is the patient using any DME equipment during OV? no    Health Maintenance Due   Topic Date Due    Varicella vaccine (1 of 2 - 13+ 2-dose series) Never done    Hepatitis B vaccine (1 of 3 - 19+ 3-dose series) Never done    DTaP/Tdap/Td vaccine (1 - Tdap) Never done    Pneumococcal 0-49 years Vaccine (1 of 2 - PCV) Never done    COVID-19 Vaccine (1 - 2024-25 season) Never done         \"Have you been to the ER, urgent care clinic since your last visit?  Hospitalized since your last visit?\"    NO    “Have you seen or consulted any other health care providers outside our system since your last visit?”    NO

## 2025-06-17 RX ORDER — ONDANSETRON 4 MG/1
TABLET, ORALLY DISINTEGRATING ORAL
Qty: 30 TABLET | Refills: 0 | Status: SHIPPED | OUTPATIENT
Start: 2025-06-17

## 2025-06-17 RX ORDER — ATENOLOL 100 MG/1
100 TABLET ORAL DAILY
Qty: 90 TABLET | Refills: 1 | Status: SHIPPED | OUTPATIENT
Start: 2025-06-17

## 2025-07-15 ENCOUNTER — TRANSCRIBE ORDERS (OUTPATIENT)
Facility: HOSPITAL | Age: 45
End: 2025-07-15

## 2025-07-15 DIAGNOSIS — Z12.31 ENCOUNTER FOR SCREENING MAMMOGRAM FOR MALIGNANT NEOPLASM OF BREAST: Primary | ICD-10-CM

## 2025-07-16 ENCOUNTER — OFFICE VISIT (OUTPATIENT)
Facility: CLINIC | Age: 45
End: 2025-07-16
Payer: MEDICAID

## 2025-07-16 VITALS
RESPIRATION RATE: 17 BRPM | DIASTOLIC BLOOD PRESSURE: 68 MMHG | HEIGHT: 59 IN | WEIGHT: 93.2 LBS | BODY MASS INDEX: 18.79 KG/M2 | SYSTOLIC BLOOD PRESSURE: 100 MMHG | HEART RATE: 71 BPM | OXYGEN SATURATION: 99 % | TEMPERATURE: 97.5 F

## 2025-07-16 DIAGNOSIS — I10 ESSENTIAL HYPERTENSION: ICD-10-CM

## 2025-07-16 DIAGNOSIS — F41.1 GENERALIZED ANXIETY DISORDER: Primary | ICD-10-CM

## 2025-07-16 DIAGNOSIS — F10.90 HEAVY ALCOHOL USE: ICD-10-CM

## 2025-07-16 DIAGNOSIS — R23.2 HOT FLASHES: ICD-10-CM

## 2025-07-16 PROCEDURE — 3078F DIAST BP <80 MM HG: CPT | Performed by: STUDENT IN AN ORGANIZED HEALTH CARE EDUCATION/TRAINING PROGRAM

## 2025-07-16 PROCEDURE — 3074F SYST BP LT 130 MM HG: CPT | Performed by: STUDENT IN AN ORGANIZED HEALTH CARE EDUCATION/TRAINING PROGRAM

## 2025-07-16 PROCEDURE — 99214 OFFICE O/P EST MOD 30 MIN: CPT | Performed by: STUDENT IN AN ORGANIZED HEALTH CARE EDUCATION/TRAINING PROGRAM

## 2025-07-16 NOTE — PROGRESS NOTES
Chronic Disease Follow up    Petra Castrejon (: 1980) is a 45 y.o. female here for evaluation of the following chief concerns(s):  Follow-up (1m follow up )       ASSESSMENT/PLAN:  1. Generalized anxiety disorder  2. Essential hypertension  3. Heavy alcohol use  4. Hot flashes    No orders of the defined types were placed in this encounter.    Anxiety-improved.  Continue trazodone    Hypertension-blood.  Continue spironolactone, atenolol    Heavy alcohol use-overall improvement.  We discussed getting back in with psychiatry, I really do think group support would help    Hot flashes-stable.  Likely perimenopausal, pending workup with GYN.  Will follow along.    No follow-ups on file.    Patient agrees with plan as above and has no additional questions at this time.     SUBJECTIVE/OBJECTIVE:    Patient presents for follow up chronic disease     She is feeling well overall.  She is still having hot flashes.  She saw GYN yesterday and had some labs ordered.  She has a transvaginal ultrasound ordered as well.  She will follow-up with them in 1 month.  Her anxiety is improved some, she still struggles a little bit.  Trazodone does help take the edge off that she is still taking 25 mg daily and this is all that she wants to take.  She has been unable to tolerate any other medications.  She is still self treating with marijuana.  The steroids really did not help any with her elbows but did help with her breathing.  She is still planning to get the MRIs and get back in with Ortho for her elbows which we had discussed last time.  She has slipped off on sobriety 2-3 times since I last saw her.  Overall doing much better over the past 6 months however.  Still taking naltrexone which she feels really helps with cravings.  She still has not contacted back the psychiatry group that we had established her with.    Vitals:    25 1259   BP: 100/68   Pulse: 71   Resp: 17   Temp: 97.5 °F (36.4 °C)   TempSrc: Temporal

## 2025-07-16 NOTE — PROGRESS NOTES
Petra Castrejon presents today for   Chief Complaint   Patient presents with    Follow-up     1m follow up        Is someone accompanying this pt? no    Is the patient using any DME equipment during OV? no    Health Maintenance Due   Topic Date Due    Varicella vaccine (1 of 2 - 13+ 2-dose series) Never done    Hepatitis B vaccine (1 of 3 - 19+ 3-dose series) Never done    DTaP/Tdap/Td vaccine (1 - Tdap) Never done    Pneumococcal 0-49 years Vaccine (1 of 2 - PCV) Never done    COVID-19 Vaccine (1 - 2024-25 season) Never done         \"Have you been to the ER, urgent care clinic since your last visit?  Hospitalized since your last visit?\"    NO    “Have you seen or consulted any other health care providers outside our system since your last visit?”    NO

## (undated) DEVICE — BNDG SOF-FORM 2X75 STRL LF --

## (undated) DEVICE — COUNTER NDL 40 COUNT HLD 70 FOAM BLK ADH W/ MAG

## (undated) DEVICE — LAPAROSCOPIC SCISSORS: Brand: EPIX LAPAROSCOPIC SCISSORS

## (undated) DEVICE — DISPOSABLE GRASPER: Brand: EPIX LAPAROSCOPIC GRASPER

## (undated) DEVICE — SPONGE GZ 4X4 IN 16-PLY DETECTABLE W/ DMT MSTR TAG

## (undated) DEVICE — INTENDED FOR TISSUE SEPARATION, AND OTHER PROCEDURES THAT REQUIRE A SHARP SURGICAL BLADE TO PUNCTURE OR CUT.: Brand: BARD-PARKER SAFETY BLADES SIZE 11, STERILE

## (undated) DEVICE — PLUMEPORT LAPAROSCOPIC SMOKE FILTRATION DEVICE: Brand: PLUMEPORT ACTIV

## (undated) DEVICE — NEEDLE HYPO 25GA L1.5IN BLU POLYPR HUB S STL REG BVL STR

## (undated) DEVICE — (D)PREP SKN CHLRAPRP APPL 26ML -- CONVERT TO ITEM 371833

## (undated) DEVICE — SUTURE MCRYL SZ 4-0 L18IN ABSRB UD L19MM PS-2 3/8 CIR PRIM Y496G

## (undated) DEVICE — GOWN,AURORA,FABRIC-REINFORCED,X-LARGE: Brand: MEDLINE

## (undated) DEVICE — IRRIGATOR VAC TRPT VLV 5MMX3CM -- HYDRO SURG PLUS

## (undated) DEVICE — LEGGINGS, PAIR, 33X51 XL, STERILE: Brand: MEDLINE

## (undated) DEVICE — FOLEY TRAY DRAINAGE BG 16 FR STBL DEV SURSTP LUBRI-SIL IC

## (undated) DEVICE — STERILE POLYISOPRENE POWDER-FREE SURGICAL GLOVES: Brand: PROTEXIS

## (undated) DEVICE — (D)SLEEVE RMFG SCD CALF SCF RG -- DISC BY MFR

## (undated) DEVICE — INSUFFLATION TUBING,LAPAROSCOPIC: Brand: DEROYAL

## (undated) DEVICE — PREP PAD BNS: Brand: CONVERTORS

## (undated) DEVICE — DBD-PACK, LITHOTOMY VI: Brand: MEDLINE

## (undated) DEVICE — MARKER SKN REG TIP W/RULER -- STRL

## (undated) DEVICE — SKIN CLOS DERMABND PRINEO 60CM -- DERMABOUND PRINEO

## (undated) DEVICE — COVER,TABLE,HEAVY DUTY,77"X90",STRL: Brand: MEDLINE

## (undated) DEVICE — SUTURE SZ 0 27IN 5/8 CIR UR-6  TAPER PT VIOLET ABSRB VICRYL J603H

## (undated) DEVICE — Y-TYPE TUR/BLADDER IRRIGATION SET, REGULATING CLAMP

## (undated) DEVICE — DISPOSABLE DISSECTOR: Brand: EPIX ® LAPAROSCOPIC DISSECTOR

## (undated) DEVICE — Z INACTIVE USE 2653177 SPONGE GZ W2XL2IN NONWOVEN 4 PLY FASTER WICKING ABIL AVANT

## (undated) DEVICE — SYR 10ML LUER LOK 1/5ML GRAD --

## (undated) DEVICE — TROCAR: Brand: KII FIOS FIRST ENTRY

## (undated) DEVICE — SYR 50ML LR LCK 1ML GRAD NSAF --

## (undated) DEVICE — VISUALIZATION SYSTEM: Brand: CLEARIFY